# Patient Record
Sex: FEMALE | Race: BLACK OR AFRICAN AMERICAN | NOT HISPANIC OR LATINO | Employment: FULL TIME | RURAL
[De-identification: names, ages, dates, MRNs, and addresses within clinical notes are randomized per-mention and may not be internally consistent; named-entity substitution may affect disease eponyms.]

---

## 2019-09-24 ENCOUNTER — HISTORICAL (OUTPATIENT)
Dept: ADMINISTRATIVE | Facility: HOSPITAL | Age: 34
End: 2019-09-24

## 2019-09-26 LAB
LAB AP CLINICAL INFORMATION: NORMAL
LAB AP COMMENTS: NORMAL
LAB AP GENERAL CAT - HISTORICAL: NORMAL
LAB AP INTERPRETATION/RESULT - HISTORICAL: NEGATIVE
LAB AP SPECIMEN ADEQUACY - HISTORICAL: NORMAL
LAB AP SPECIMEN SUBMITTED - HISTORICAL: NORMAL

## 2019-10-22 ENCOUNTER — HISTORICAL (OUTPATIENT)
Dept: ADMINISTRATIVE | Facility: HOSPITAL | Age: 34
End: 2019-10-22

## 2019-10-23 LAB
LAB AP CLINICAL INFORMATION: NORMAL
LAB AP DIAGNOSIS - HISTORICAL: NORMAL
LAB AP GROSS PATHOLOGY - HISTORICAL: NORMAL
LAB AP SPECIMEN SUBMITTED - HISTORICAL: NORMAL

## 2020-03-05 ENCOUNTER — HISTORICAL (OUTPATIENT)
Dept: ADMINISTRATIVE | Facility: HOSPITAL | Age: 35
End: 2020-03-05

## 2020-10-15 ENCOUNTER — HISTORICAL (OUTPATIENT)
Dept: ADMINISTRATIVE | Facility: HOSPITAL | Age: 35
End: 2020-10-15

## 2020-10-15 LAB
25(OH)D3 SERPL-MCNC: 6.6 NG/ML
ALBUMIN SERPL BCP-MCNC: 3.4 G/DL (ref 3.5–5)
ALBUMIN/GLOB SERPL: 0.8 {RATIO}
ALP SERPL-CCNC: 42 U/L (ref 37–98)
ALT SERPL W P-5'-P-CCNC: 22 U/L (ref 13–56)
ANION GAP SERPL CALCULATED.3IONS-SCNC: 10.5 MMOL/L (ref 7–16)
AST SERPL W P-5'-P-CCNC: 13 U/L (ref 15–37)
BASOPHILS # BLD AUTO: 0.03 X10E3/UL (ref 0–0.2)
BASOPHILS NFR BLD AUTO: 0.4 % (ref 0–1)
BILIRUB SERPL-MCNC: 0.2 MG/DL (ref 0–1.2)
BUN SERPL-MCNC: 11 MG/DL (ref 7–18)
BUN/CREAT SERPL: 12
CALCIUM SERPL-MCNC: 8.8 MG/DL (ref 8.5–10.1)
CHLORIDE SERPL-SCNC: 107 MMOL/L (ref 98–107)
CHOLEST SERPL-MCNC: 162 MG/DL
CHOLEST/HDLC SERPL: 3.6 {RATIO}
CO2 SERPL-SCNC: 26 MMOL/L (ref 21–32)
CREAT SERPL-MCNC: 0.91 MG/DL (ref 0.5–1.02)
EOSINOPHIL # BLD AUTO: 0.17 X10E3/UL (ref 0–0.5)
EOSINOPHIL NFR BLD AUTO: 2.2 % (ref 1–4)
ERYTHROCYTE [DISTWIDTH] IN BLOOD BY AUTOMATED COUNT: 16.5 % (ref 11.5–14.5)
EST. AVERAGE GLUCOSE BLD GHB EST-MCNC: 114 MG/DL
GLOBULIN SER-MCNC: 4.4 G/DL (ref 2–4)
GLUCOSE SERPL-MCNC: 93 MG/DL (ref 74–106)
HBA1C MFR BLD HPLC: 6 %
HCT VFR BLD AUTO: 37.4 % (ref 38–47)
HDLC SERPL-MCNC: 45 MG/DL
HGB BLD-MCNC: 11.4 G/DL (ref 12–16)
IMM GRANULOCYTES # BLD AUTO: 0.01 X10E3/UL (ref 0–0.04)
IMM GRANULOCYTES NFR BLD: 0.1 % (ref 0–0.4)
LDLC SERPL CALC-MCNC: 93 MG/DL
LYMPHOCYTES # BLD AUTO: 2.94 X10E3/UL (ref 1–4.8)
LYMPHOCYTES NFR BLD AUTO: 37.2 % (ref 27–41)
MCH RBC QN AUTO: 24.9 PG (ref 27–31)
MCHC RBC AUTO-ENTMCNC: 30.5 G/DL (ref 32–36)
MCV RBC AUTO: 81.7 FL (ref 80–96)
MONOCYTES # BLD AUTO: 0.58 X10E3/UL (ref 0–0.8)
MONOCYTES NFR BLD AUTO: 7.3 % (ref 2–6)
MPC BLD CALC-MCNC: 11 FL (ref 9.4–12.4)
NEUTROPHILS # BLD AUTO: 4.17 X10E3/UL (ref 1.8–7.7)
NEUTROPHILS NFR BLD AUTO: 52.8 % (ref 53–65)
NRBC # BLD AUTO: 0 X10E3/UL (ref 0–0)
NRBC, AUTO (.00): 0 /100 (ref 0–0)
PLATELET # BLD AUTO: 410 X10E3/UL (ref 150–400)
POTASSIUM SERPL-SCNC: 3.5 MMOL/L (ref 3.5–5.1)
PROT SERPL-MCNC: 7.8 G/DL (ref 6.4–8.2)
RBC # BLD AUTO: 4.58 X10E6/UL (ref 4.2–5.4)
SODIUM SERPL-SCNC: 140 MMOL/L (ref 136–145)
TRIGL SERPL-MCNC: 119 MG/DL
TSH SERPL DL<=0.005 MIU/L-ACNC: 1.85 UIU/ML (ref 0.36–3.74)
WBC # BLD AUTO: 7.9 X10E3/UL (ref 4.5–11)

## 2021-01-15 ENCOUNTER — HISTORICAL (OUTPATIENT)
Dept: ADMINISTRATIVE | Facility: HOSPITAL | Age: 36
End: 2021-01-15

## 2021-01-19 LAB
LAB AP CLINICAL INFORMATION: NORMAL
LAB AP GENERAL CAT - HISTORICAL: NORMAL
LAB AP INTERPRETATION/RESULT - HISTORICAL: NEGATIVE
LAB AP SPECIMEN ADEQUACY - HISTORICAL: NORMAL
LAB AP SPECIMEN SUBMITTED - HISTORICAL: NORMAL

## 2021-10-21 ENCOUNTER — OFFICE VISIT (OUTPATIENT)
Dept: FAMILY MEDICINE | Facility: CLINIC | Age: 36
End: 2021-10-21
Payer: COMMERCIAL

## 2021-10-21 VITALS
TEMPERATURE: 98 F | WEIGHT: 279 LBS | BODY MASS INDEX: 44.84 KG/M2 | HEART RATE: 93 BPM | SYSTOLIC BLOOD PRESSURE: 136 MMHG | DIASTOLIC BLOOD PRESSURE: 77 MMHG | RESPIRATION RATE: 18 BRPM | HEIGHT: 66 IN

## 2021-10-21 DIAGNOSIS — J06.9 UPPER RESPIRATORY TRACT INFECTION, UNSPECIFIED TYPE: ICD-10-CM

## 2021-10-21 DIAGNOSIS — J02.9 SORE THROAT: ICD-10-CM

## 2021-10-21 DIAGNOSIS — Z11.59 ENCOUNTER FOR SCREENING FOR VIRAL DISEASE: Primary | ICD-10-CM

## 2021-10-21 LAB
CTP QC/QA: YES
CTP QC/QA: YES
FLUAV AG NPH QL: NEGATIVE
FLUBV AG NPH QL: NEGATIVE
S PYO RRNA THROAT QL PROBE: NEGATIVE
SARS-COV-2 AG RESP QL IA.RAPID: NEGATIVE

## 2021-10-21 PROCEDURE — 96372 PR INJECTION,THERAP/PROPH/DIAG2ST, IM OR SUBCUT: ICD-10-PCS | Mod: ,,, | Performed by: NURSE PRACTITIONER

## 2021-10-21 PROCEDURE — 87428 SARSCOV & INF VIR A&B AG IA: CPT | Mod: QW,,, | Performed by: NURSE PRACTITIONER

## 2021-10-21 PROCEDURE — 96372 THER/PROPH/DIAG INJ SC/IM: CPT | Mod: ,,, | Performed by: NURSE PRACTITIONER

## 2021-10-21 PROCEDURE — 87428 POCT SARS-COV2 (COVID) WITH FLU ANTIGEN: ICD-10-PCS | Mod: QW,,, | Performed by: NURSE PRACTITIONER

## 2021-10-21 PROCEDURE — 99213 OFFICE O/P EST LOW 20 MIN: CPT | Mod: 25,,, | Performed by: NURSE PRACTITIONER

## 2021-10-21 PROCEDURE — 87880 STREP A ASSAY W/OPTIC: CPT | Mod: QW,,, | Performed by: NURSE PRACTITIONER

## 2021-10-21 PROCEDURE — 99213 PR OFFICE/OUTPT VISIT, EST, LEVL III, 20-29 MIN: ICD-10-PCS | Mod: 25,,, | Performed by: NURSE PRACTITIONER

## 2021-10-21 PROCEDURE — 87880 POCT RAPID STREP A: ICD-10-PCS | Mod: QW,,, | Performed by: NURSE PRACTITIONER

## 2021-10-21 RX ORDER — LEVOCETIRIZINE DIHYDROCHLORIDE 5 MG/1
5 TABLET, FILM COATED ORAL NIGHTLY
Qty: 30 TABLET | Refills: 11 | Status: SHIPPED | OUTPATIENT
Start: 2021-10-21 | End: 2022-10-21

## 2021-10-21 RX ORDER — DOXYCYCLINE 100 MG/1
100 CAPSULE ORAL 2 TIMES DAILY
Qty: 20 CAPSULE | Refills: 0 | Status: SHIPPED | OUTPATIENT
Start: 2021-10-21 | End: 2021-10-31

## 2021-10-21 RX ORDER — METHYLPREDNISOLONE 4 MG/1
TABLET ORAL
Qty: 21 EACH | Refills: 0 | Status: SHIPPED | OUTPATIENT
Start: 2021-10-21 | End: 2021-11-11

## 2021-10-21 RX ORDER — CEFTRIAXONE 1 G/1
1 INJECTION, POWDER, FOR SOLUTION INTRAMUSCULAR; INTRAVENOUS ONCE
Status: COMPLETED | OUTPATIENT
Start: 2021-10-21 | End: 2021-10-21

## 2021-10-21 RX ADMIN — CEFTRIAXONE 1 G: 1 INJECTION, POWDER, FOR SOLUTION INTRAMUSCULAR; INTRAVENOUS at 12:10

## 2022-03-08 ENCOUNTER — LAB VISIT (OUTPATIENT)
Dept: PRIMARY CARE CLINIC | Facility: CLINIC | Age: 37
End: 2022-03-08

## 2022-03-08 DIAGNOSIS — Z02.83 ENCOUNTER FOR EMPLOYMENT-RELATED DRUG TESTING: ICD-10-CM

## 2022-03-08 PROCEDURE — 99000 PR SPECIMEN HANDLING,DR OFF->LAB: ICD-10-PCS | Mod: ,,, | Performed by: NURSE PRACTITIONER

## 2022-03-08 PROCEDURE — 99000 SPECIMEN HANDLING OFFICE-LAB: CPT | Mod: ,,, | Performed by: NURSE PRACTITIONER

## 2022-03-08 NOTE — PROGRESS NOTES
Subjective:       Patient ID: Wilver Torres is a 36 y.o. female.    Chief Complaint: No chief complaint on file.    HPI  Review of Systems      Objective:      Physical Exam    Assessment:       Problem List Items Addressed This Visit    None     Visit Diagnoses     Encounter for employment-related drug testing              Plan:       Drug testing only

## 2022-11-10 ENCOUNTER — OFFICE VISIT (OUTPATIENT)
Dept: FAMILY MEDICINE | Facility: CLINIC | Age: 37
End: 2022-11-10

## 2022-11-10 VITALS
WEIGHT: 282.81 LBS | SYSTOLIC BLOOD PRESSURE: 151 MMHG | OXYGEN SATURATION: 100 % | BODY MASS INDEX: 45.45 KG/M2 | DIASTOLIC BLOOD PRESSURE: 82 MMHG | RESPIRATION RATE: 18 BRPM | HEART RATE: 93 BPM | HEIGHT: 66 IN

## 2022-11-10 DIAGNOSIS — N91.2 AMENORRHEA: ICD-10-CM

## 2022-11-10 DIAGNOSIS — R42 DIZZINESS: ICD-10-CM

## 2022-11-10 DIAGNOSIS — R53.83 FATIGUE, UNSPECIFIED TYPE: Primary | ICD-10-CM

## 2022-11-10 DIAGNOSIS — R73.01 IMPAIRED FASTING BLOOD SUGAR: ICD-10-CM

## 2022-11-10 DIAGNOSIS — Z13.220 SCREENING FOR HYPERCHOLESTEROLEMIA: ICD-10-CM

## 2022-11-10 LAB
25(OH)D3 SERPL-MCNC: 20.8 NG/ML
ALBUMIN SERPL BCP-MCNC: 3.6 G/DL (ref 3.5–5)
ALBUMIN/GLOB SERPL: 0.9 {RATIO}
ALP SERPL-CCNC: 45 U/L (ref 37–98)
ALT SERPL W P-5'-P-CCNC: 22 U/L (ref 13–56)
ANION GAP SERPL CALCULATED.3IONS-SCNC: 10 MMOL/L (ref 7–16)
AST SERPL W P-5'-P-CCNC: 11 U/L (ref 15–37)
B-HCG UR QL: NEGATIVE
BASOPHILS # BLD AUTO: 0.03 K/UL (ref 0–0.2)
BASOPHILS NFR BLD AUTO: 0.4 % (ref 0–1)
BILIRUB SERPL-MCNC: 0.3 MG/DL (ref ?–1.2)
BILIRUB SERPL-MCNC: NORMAL MG/DL
BLOOD URINE, POC: NORMAL
BUN SERPL-MCNC: 13 MG/DL (ref 7–18)
BUN/CREAT SERPL: 16 (ref 6–20)
CALCIUM SERPL-MCNC: 9.1 MG/DL (ref 8.5–10.1)
CHLORIDE SERPL-SCNC: 106 MMOL/L (ref 98–107)
CHOLEST SERPL-MCNC: 176 MG/DL (ref 0–200)
CHOLEST/HDLC SERPL: 3.2 {RATIO}
CO2 SERPL-SCNC: 24 MMOL/L (ref 21–32)
COLOR, POC UA: YELLOW
CREAT SERPL-MCNC: 0.83 MG/DL (ref 0.55–1.02)
CREAT UR-MCNC: 165 MG/DL (ref 28–219)
CTP QC/QA: YES
DIFFERENTIAL METHOD BLD: ABNORMAL
EGFR (NO RACE VARIABLE) (RUSH/TITUS): 94 ML/MIN/1.73M²
EOSINOPHIL # BLD AUTO: 0.1 K/UL (ref 0–0.5)
EOSINOPHIL NFR BLD AUTO: 1.3 % (ref 1–4)
ERYTHROCYTE [DISTWIDTH] IN BLOOD BY AUTOMATED COUNT: 16.2 % (ref 11.5–14.5)
EST. AVERAGE GLUCOSE BLD GHB EST-MCNC: 100 MG/DL
ESTRADIOL SERPL-MCNC: 87.9 PG/ML
FOLATE SERPL-MCNC: 4.7 NG/ML (ref 3.1–17.5)
GLOBULIN SER-MCNC: 4.1 G/DL (ref 2–4)
GLUCOSE SERPL-MCNC: 77 MG/DL (ref 74–106)
GLUCOSE UR QL STRIP: NORMAL
HBA1C MFR BLD HPLC: 5.6 % (ref 4.5–6.6)
HCT VFR BLD AUTO: 38.7 % (ref 38–47)
HDLC SERPL-MCNC: 55 MG/DL (ref 40–60)
HGB BLD-MCNC: 12 G/DL (ref 12–16)
IMM GRANULOCYTES # BLD AUTO: 0.02 K/UL (ref 0–0.04)
IMM GRANULOCYTES NFR BLD: 0.3 % (ref 0–0.4)
KETONES UR QL STRIP: NORMAL
LDLC SERPL CALC-MCNC: 105 MG/DL
LDLC/HDLC SERPL: 1.9 {RATIO}
LEUKOCYTE ESTERASE URINE, POC: NORMAL
LYMPHOCYTES # BLD AUTO: 3.32 K/UL (ref 1–4.8)
LYMPHOCYTES NFR BLD AUTO: 43.1 % (ref 27–41)
MCH RBC QN AUTO: 25.8 PG (ref 27–31)
MCHC RBC AUTO-ENTMCNC: 31 G/DL (ref 32–36)
MCV RBC AUTO: 83.2 FL (ref 80–96)
MICROALBUMIN UR-MCNC: 0.9 MG/DL (ref 0–2.8)
MICROALBUMIN/CREAT RATIO PNL UR: 5.5 MG/G (ref 0–30)
MONOCYTES # BLD AUTO: 0.52 K/UL (ref 0–0.8)
MONOCYTES NFR BLD AUTO: 6.8 % (ref 2–6)
MPC BLD CALC-MCNC: 10 FL (ref 9.4–12.4)
NEUTROPHILS # BLD AUTO: 3.71 K/UL (ref 1.8–7.7)
NEUTROPHILS NFR BLD AUTO: 48.1 % (ref 53–65)
NITRITE, POC UA: NORMAL
NONHDLC SERPL-MCNC: 121 MG/DL
NRBC # BLD AUTO: 0 X10E3/UL
NRBC, AUTO (.00): 0 %
PH, POC UA: 5.5
PLATELET # BLD AUTO: 367 K/UL (ref 150–400)
POTASSIUM SERPL-SCNC: 4.1 MMOL/L (ref 3.5–5.1)
PROGEST SERPL-MCNC: 1.2 NG/ML
PROT SERPL-MCNC: 7.7 G/DL (ref 6.4–8.2)
PROTEIN, POC: NORMAL
RBC # BLD AUTO: 4.65 M/UL (ref 4.2–5.4)
SODIUM SERPL-SCNC: 136 MMOL/L (ref 136–145)
SPECIFIC GRAVITY, POC UA: 1.03
T4 FREE SERPL-MCNC: 0.95 NG/DL (ref 0.76–1.46)
TRIGL SERPL-MCNC: 81 MG/DL (ref 35–150)
TSH SERPL DL<=0.005 MIU/L-ACNC: 1.51 UIU/ML (ref 0.36–3.74)
UROBILINOGEN, POC UA: 0.2
VIT B12 SERPL-MCNC: 433 PG/ML (ref 193–986)
VLDLC SERPL-MCNC: 16 MG/DL
WBC # BLD AUTO: 7.7 K/UL (ref 4.5–11)

## 2022-11-10 PROCEDURE — 85025 COMPLETE CBC W/AUTO DIFF WBC: CPT | Mod: ,,, | Performed by: CLINICAL MEDICAL LABORATORY

## 2022-11-10 PROCEDURE — 82306 VITAMIN D 25 HYDROXY: CPT | Mod: ,,, | Performed by: CLINICAL MEDICAL LABORATORY

## 2022-11-10 PROCEDURE — 82746 ASSAY OF FOLIC ACID SERUM: CPT | Mod: ,,, | Performed by: CLINICAL MEDICAL LABORATORY

## 2022-11-10 PROCEDURE — 81025 URINE PREGNANCY TEST: CPT | Mod: QW,,, | Performed by: NURSE PRACTITIONER

## 2022-11-10 PROCEDURE — 84144 ASSAY OF PROGESTERONE: CPT | Mod: ,,, | Performed by: CLINICAL MEDICAL LABORATORY

## 2022-11-10 PROCEDURE — 84439 THYROID PANEL: ICD-10-PCS | Mod: ,,, | Performed by: CLINICAL MEDICAL LABORATORY

## 2022-11-10 PROCEDURE — 84443 THYROID PANEL: ICD-10-PCS | Mod: ,,, | Performed by: CLINICAL MEDICAL LABORATORY

## 2022-11-10 PROCEDURE — 84439 ASSAY OF FREE THYROXINE: CPT | Mod: ,,, | Performed by: CLINICAL MEDICAL LABORATORY

## 2022-11-10 PROCEDURE — 82306 VITAMIN D: ICD-10-PCS | Mod: ,,, | Performed by: CLINICAL MEDICAL LABORATORY

## 2022-11-10 PROCEDURE — 80061 LIPID PANEL: ICD-10-PCS | Mod: ,,, | Performed by: CLINICAL MEDICAL LABORATORY

## 2022-11-10 PROCEDURE — 82570 MICROALBUMIN / CREATININE RATIO URINE: ICD-10-PCS | Mod: ,,, | Performed by: CLINICAL MEDICAL LABORATORY

## 2022-11-10 PROCEDURE — 99213 PR OFFICE/OUTPT VISIT, EST, LEVL III, 20-29 MIN: ICD-10-PCS | Mod: ,,, | Performed by: NURSE PRACTITIONER

## 2022-11-10 PROCEDURE — 80053 COMPREHEN METABOLIC PANEL: CPT | Mod: ,,, | Performed by: CLINICAL MEDICAL LABORATORY

## 2022-11-10 PROCEDURE — 82746 VITAMIN B12/FOLATE, SERUM PANEL: ICD-10-PCS | Mod: ,,, | Performed by: CLINICAL MEDICAL LABORATORY

## 2022-11-10 PROCEDURE — 84144 PROGESTERONE: ICD-10-PCS | Mod: ,,, | Performed by: CLINICAL MEDICAL LABORATORY

## 2022-11-10 PROCEDURE — 84443 ASSAY THYROID STIM HORMONE: CPT | Mod: ,,, | Performed by: CLINICAL MEDICAL LABORATORY

## 2022-11-10 PROCEDURE — 81025 POCT URINE PREGNANCY: ICD-10-PCS | Mod: QW,,, | Performed by: NURSE PRACTITIONER

## 2022-11-10 PROCEDURE — 81003 URINALYSIS AUTO W/O SCOPE: CPT | Mod: QW,,, | Performed by: NURSE PRACTITIONER

## 2022-11-10 PROCEDURE — 80053 COMPREHENSIVE METABOLIC PANEL: ICD-10-PCS | Mod: ,,, | Performed by: CLINICAL MEDICAL LABORATORY

## 2022-11-10 PROCEDURE — 83036 HEMOGLOBIN A1C: ICD-10-PCS | Mod: ,,, | Performed by: CLINICAL MEDICAL LABORATORY

## 2022-11-10 PROCEDURE — 80061 LIPID PANEL: CPT | Mod: ,,, | Performed by: CLINICAL MEDICAL LABORATORY

## 2022-11-10 PROCEDURE — 82670 ASSAY OF TOTAL ESTRADIOL: CPT | Mod: ,,, | Performed by: CLINICAL MEDICAL LABORATORY

## 2022-11-10 PROCEDURE — 81003 POCT URINALYSIS W/O SCOPE: ICD-10-PCS | Mod: QW,,, | Performed by: NURSE PRACTITIONER

## 2022-11-10 PROCEDURE — 82043 UR ALBUMIN QUANTITATIVE: CPT | Mod: ,,, | Performed by: CLINICAL MEDICAL LABORATORY

## 2022-11-10 PROCEDURE — 82607 VITAMIN B12/FOLATE, SERUM PANEL: ICD-10-PCS | Mod: ,,, | Performed by: CLINICAL MEDICAL LABORATORY

## 2022-11-10 PROCEDURE — 82570 ASSAY OF URINE CREATININE: CPT | Mod: ,,, | Performed by: CLINICAL MEDICAL LABORATORY

## 2022-11-10 PROCEDURE — 85025 CBC WITH DIFFERENTIAL: ICD-10-PCS | Mod: ,,, | Performed by: CLINICAL MEDICAL LABORATORY

## 2022-11-10 PROCEDURE — 82607 VITAMIN B-12: CPT | Mod: ,,, | Performed by: CLINICAL MEDICAL LABORATORY

## 2022-11-10 PROCEDURE — 83036 HEMOGLOBIN GLYCOSYLATED A1C: CPT | Mod: ,,, | Performed by: CLINICAL MEDICAL LABORATORY

## 2022-11-10 PROCEDURE — 99213 OFFICE O/P EST LOW 20 MIN: CPT | Mod: ,,, | Performed by: NURSE PRACTITIONER

## 2022-11-10 PROCEDURE — 82670 ESTRADIOL: ICD-10-PCS | Mod: ,,, | Performed by: CLINICAL MEDICAL LABORATORY

## 2022-11-10 PROCEDURE — 82043 MICROALBUMIN / CREATININE RATIO URINE: ICD-10-PCS | Mod: ,,, | Performed by: CLINICAL MEDICAL LABORATORY

## 2022-11-10 NOTE — PROGRESS NOTES
"   Betty Hinton NP   [address] [place] [address]     PATIENT NAME: Wilver Torres  : 1985  DATE: 11/10/22  MRN: [id number]      Billing Provider: Betty Hinton NP  Level of Service: [place] OFFICE/OUTPT VISIT, EST, LEVL III, 20-29 MIN  Patient PCP Information       Provider PCP Type    Betty Hinton NP General            Reason for Visit / Chief Complaint: Dizziness and Fatigue       Update PCP  Update Chief Complaint         History of Present Illness / Problem Focused Workflow     Wilver Torres presents to the clinic with Dizziness and Fatigue     Patient is a 37 year old female presents to the clinic w/ complaints of generalized fatigue w/ occasional "dizziness." The patient reports she works night shifts and this dizziness only occurs during work. She reports the onset is quick but only last for 2-3 seconds. She does report she is hydrated. She denies any chest pain, shortness of breath, syncope. She states she has felt the impending faint. She does not take any daily medications and denies any pertinent past medical history.     Dizziness:   Chronicity:  New and recurrent  Onset:  More than 1 month ago  Duration:  Very brief  Dizziness characteristics:  Off-balance and lightheaded/impending faint   Associated symptoms: light-headedness.no fever, no headaches, no nausea, no vomiting, no diaphoresis, no weakness, no visual disturbances, no palpitations, no slurred speech, no numbness in extremities and no chest pain.  Fatigue  This is a new problem. The current episode started more than 1 month ago. Associated symptoms include fatigue. Pertinent negatives include no chest pain, diaphoresis, fever, headaches, nausea, vomiting or weakness.     Review of Systems     Review of Systems   Constitutional:  Positive for fatigue. Negative for diaphoresis and fever.   Cardiovascular:  Negative for chest pain and palpitations.   Gastrointestinal:  Negative for " "nausea and vomiting.   Neurological:  Positive for dizziness and light-headedness. Negative for weakness and headaches.   All other systems reviewed and are negative.     Medical / Social / Family History   History reviewed. No pertinent past medical history.    Past Surgical History:   Procedure Laterality Date    UTERINE FIBROID SURGERY         Social History  Ms.  reports that she has never smoked. She has never used smokeless tobacco. She reports current alcohol use. She reports that she does not use drugs.    Family History  MsConcha's family history is not on file.    Medications and Allergies     Medications  No outpatient medications have been marked as taking for the 11/10/22 encounter (Office Visit) with Betty Hinton NP.       Allergies  Review of patient's allergies indicates:  No Known Allergies    Physical Examination   BP (!) 151/82 (BP Location: Left arm, Patient Position: Sitting, BP Method: Medium (Automatic))   Pulse 93   Resp 18   Ht 5' 6" (1.676 m)   Wt 128.3 kg (282 lb 12.8 oz)   LMP 09/23/2022 (Approximate)   SpO2 100%   BMI 45.65 kg/m²    Physical Exam  Vitals and nursing note reviewed.   Constitutional:       Appearance: Normal appearance.   HENT:      Head: Normocephalic.      Right Ear: Tympanic membrane normal.      Left Ear: Tympanic membrane normal.      Nose: Nose normal.      Mouth/Throat:      Mouth: Mucous membranes are moist.      Pharynx: Oropharynx is clear.   Eyes:      Conjunctiva/sclera: Conjunctivae normal.      Pupils: Pupils are equal, round, and reactive to light.   Cardiovascular:      Rate and Rhythm: Normal rate and regular rhythm.      Pulses: Normal pulses.      Heart sounds: Normal heart sounds.   Pulmonary:      Effort: Pulmonary effort is normal.      Breath sounds: Normal breath sounds.   Abdominal:      General: Bowel sounds are normal.      Palpations: Abdomen is soft.   Musculoskeletal:         General: Normal range of motion.      Cervical back: " Normal range of motion and neck supple.   Skin:     General: Skin is warm.      Capillary Refill: Capillary refill takes less than 2 seconds.   Neurological:      General: No focal deficit present.      Mental Status: She is alert and oriented to person, place, and time.   Psychiatric:         Mood and Affect: Mood normal.         Thought Content: Thought content normal.        Assessment and Plan (including Health Maintenance)      Problem List  Smart Sets  Document Outside HM   :    Plan:         Health Maintenance Due   Topic Date Due    Hepatitis C Screening  Never done    Cervical Cancer Screening  Never done    Lipid Panel  Never done    COVID-19 Vaccine (1) Never done    HIV Screening  Never done    TETANUS VACCINE  Never done    Influenza Vaccine (1) Never done       Problem List Items Addressed This Visit    None  Visit Diagnoses       Fatigue, unspecified type    -  Primary    Relevant Orders    Thyroid Panel    CBC Auto Differential    Comprehensive Metabolic Panel    Progesterone    Estradiol    Dizziness        Relevant Orders    Vitamin D    Vitamin B12 & Folate    Progesterone    Estradiol    Impaired fasting blood sugar        Relevant Orders    Microalbumin/Creatinine Ratio, Urine    Hemoglobin A1C    Screening for hypercholesterolemia        Relevant Orders    Lipid Panel    Amenorrhea                The patient has no Health Maintenance topics of status Not Due    No future appointments.         Signature:  Betty Hinton NP      [address] [place] [address]    Date of encounter: 11/10/22

## 2023-12-15 ENCOUNTER — OFFICE VISIT (OUTPATIENT)
Dept: FAMILY MEDICINE | Facility: CLINIC | Age: 38
End: 2023-12-15
Payer: COMMERCIAL

## 2023-12-15 VITALS
SYSTOLIC BLOOD PRESSURE: 132 MMHG | RESPIRATION RATE: 18 BRPM | DIASTOLIC BLOOD PRESSURE: 86 MMHG | BODY MASS INDEX: 47.09 KG/M2 | HEART RATE: 84 BPM | HEIGHT: 66 IN | TEMPERATURE: 98 F | OXYGEN SATURATION: 99 % | WEIGHT: 293 LBS

## 2023-12-15 DIAGNOSIS — E66.01 MORBID OBESITY WITH BMI OF 45.0-49.9, ADULT: ICD-10-CM

## 2023-12-15 DIAGNOSIS — J02.9 SORE THROAT: ICD-10-CM

## 2023-12-15 DIAGNOSIS — R05.9 COUGH, UNSPECIFIED TYPE: Primary | ICD-10-CM

## 2023-12-15 DIAGNOSIS — R09.81 NASAL CONGESTION: ICD-10-CM

## 2023-12-15 DIAGNOSIS — J02.0 STREP THROAT: ICD-10-CM

## 2023-12-15 LAB
CTP QC/QA: YES
FLUAV AG NPH QL: NEGATIVE
FLUBV AG NPH QL: NEGATIVE
S PYO RRNA THROAT QL PROBE: POSITIVE
SARS-COV-2 AG RESP QL IA.RAPID: NEGATIVE

## 2023-12-15 PROCEDURE — 87804 INFLUENZA ASSAY W/OPTIC: CPT | Mod: 59,QW,,

## 2023-12-15 PROCEDURE — 87804 POCT INFLUENZA A/B: ICD-10-PCS | Mod: 59,QW,,

## 2023-12-15 PROCEDURE — 87426 SARS CORONAVIRUS 2 ANTIGEN POCT: ICD-10-PCS | Mod: QW,,,

## 2023-12-15 PROCEDURE — 87426 SARSCOV CORONAVIRUS AG IA: CPT | Mod: QW,,,

## 2023-12-15 PROCEDURE — 87880 STREP A ASSAY W/OPTIC: CPT | Mod: QW,,,

## 2023-12-15 PROCEDURE — 87880 POCT RAPID STREP A: ICD-10-PCS | Mod: QW,,,

## 2023-12-15 PROCEDURE — 99213 PR OFFICE/OUTPT VISIT, EST, LEVL III, 20-29 MIN: ICD-10-PCS | Mod: ,,,

## 2023-12-15 PROCEDURE — 99213 OFFICE O/P EST LOW 20 MIN: CPT | Mod: ,,,

## 2023-12-15 RX ORDER — METHYLPREDNISOLONE 4 MG/1
TABLET ORAL
Qty: 21 TABLET | Refills: 0 | Status: SHIPPED | OUTPATIENT
Start: 2023-12-15

## 2023-12-15 RX ORDER — AMOXICILLIN 500 MG/1
500 TABLET, FILM COATED ORAL EVERY 12 HOURS
Qty: 20 TABLET | Refills: 0 | Status: SHIPPED | OUTPATIENT
Start: 2023-12-15 | End: 2023-12-25

## 2023-12-15 RX ORDER — FLUTICASONE PROPIONATE 50 MCG
1 SPRAY, SUSPENSION (ML) NASAL DAILY
Qty: 11.1 ML | Refills: 0 | Status: SHIPPED | OUTPATIENT
Start: 2023-12-15

## 2023-12-15 NOTE — PROGRESS NOTES
Marichuy Colvin NP   [address] [place] [address]     PATIENT NAME: Wilver Torres  : 1985  DATE: 12/15/23  MRN: [id number]      Billing Provider: Marichuy Colvin NP  Level of Service:   Patient PCP Information       Provider PCP Type    Betty Hinton NP General            Reason for Visit / Chief Complaint: Cough, Nasal Congestion, and Sore Throat       Update PCP  Update Chief Complaint         History of Present Illness / Problem Focused Workflow     Wilver Torres presents to the clinic with Cough, Nasal Congestion, and Sore Throat     Patient here today with complaints of sinus pressure, cough, and sore throat. No fever, no chills, has body aches. Symptoms started 3 days ago. Positive for strep today. Medications sent to pharmacy.. Advised to change tooth brush and linen/pillow cases every 48 hours. Try warm salt water gargles and throat lozenges, frequent hand washing. Tylenol or Ibuprofen as needed for fever. Return to clinic if symptoms worsen and as needed.     Cough  Associated symptoms include a fever and a sore throat.   Sore Throat   Associated symptoms include congestion and coughing.     Review of Systems     Review of Systems   Constitutional:  Positive for fever.   HENT:  Positive for nasal congestion and sore throat.    Eyes: Negative.    Respiratory:  Positive for cough.    Cardiovascular: Negative.    Gastrointestinal: Negative.    Endocrine: Negative.    Genitourinary: Negative.    Musculoskeletal: Negative.    Integumentary:  Negative.   Allergic/Immunologic: Negative.    Neurological: Negative.    Hematological: Negative.    Psychiatric/Behavioral: Negative.        Medical / Social / Family History   History reviewed. No pertinent past medical history.    Past Surgical History:   Procedure Laterality Date    UTERINE FIBROID SURGERY         Social History  Ms.  reports that she has never smoked. She has never used smokeless tobacco. She reports  "current alcohol use. She reports that she does not use drugs.    Family History  Ms.'s family history is not on file.    Medications and Allergies     Medications  No outpatient medications have been marked as taking for the 12/15/23 encounter (Office Visit) with Marichuy Colvin NP.       Allergies  Review of patient's allergies indicates:  No Known Allergies    Physical Examination   BP (!) 137/94 (BP Location: Left arm, Patient Position: Sitting, BP Method: Large (Automatic))   Pulse 84   Temp 98.1 °F (36.7 °C) (Oral)   Resp 18   Ht 5' 6" (1.676 m)   Wt 133.8 kg (295 lb)   SpO2 99%   BMI 47.61 kg/m²    Physical Exam  Vitals and nursing note reviewed.   Constitutional:       Appearance: She is obese.   HENT:      Head: Normocephalic.      Right Ear: Tympanic membrane normal.      Left Ear: Tympanic membrane normal.      Nose: Congestion present. No rhinorrhea.      Mouth/Throat:      Mouth: Mucous membranes are moist.      Pharynx: Oropharyngeal exudate and posterior oropharyngeal erythema present.   Eyes:      Extraocular Movements: Extraocular movements intact.      Conjunctiva/sclera: Conjunctivae normal.      Pupils: Pupils are equal, round, and reactive to light.   Cardiovascular:      Rate and Rhythm: Normal rate and regular rhythm.      Pulses: Normal pulses.      Heart sounds: Normal heart sounds.   Pulmonary:      Effort: Pulmonary effort is normal.      Breath sounds: Normal breath sounds.   Abdominal:      General: Abdomen is flat. Bowel sounds are normal.      Palpations: Abdomen is soft.   Musculoskeletal:         General: Normal range of motion.      Cervical back: Normal range of motion and neck supple. No tenderness.   Lymphadenopathy:      Cervical: Cervical adenopathy present.   Skin:     General: Skin is warm and dry.      Capillary Refill: Capillary refill takes less than 2 seconds.   Neurological:      General: No focal deficit present.      Mental Status: She is alert and oriented " to person, place, and time. Mental status is at baseline.   Psychiatric:         Mood and Affect: Mood normal.         Behavior: Behavior normal.        Assessment and Plan (including Health Maintenance)      Problem List  Smart Sets  Document Outside HM   :    Plan:         Health Maintenance Due   Topic Date Due    Hepatitis C Screening  Never done    COVID-19 Vaccine (1) Never done    HIV Screening  Never done    TETANUS VACCINE  Never done    Influenza Vaccine (1) Never done    Hemoglobin A1c (Prediabetes)  11/10/2023    Cervical Cancer Screening  01/15/2024       Problem List Items Addressed This Visit    None  Visit Diagnoses       Cough, unspecified type    -  Primary    Relevant Orders    SARS Coronavirus 2 Antigen, POCT    POCT rapid strep A    POCT Influenza A/B            The patient has no Health Maintenance topics of status Not Due    No future appointments.         Signature:  Marichuy Colvin NP      [address] [place] [address]    Date of encounter: 12/15/23

## 2023-12-16 NOTE — PATIENT INSTRUCTIONS
change tooth brush and linen/pillow cases every 48 hours.   Try warm salt water gargles and throat lozenges,   frequent hand washing.   Tylenol or Ibuprofen as needed for fever.   Return to clinic if symptoms worsen and as needed.

## 2023-12-16 NOTE — ASSESSMENT & PLAN NOTE
Patient here today with complaints of sinus pressure, cough, and sore throat. No fever, no chills, has body aches. Symptoms started 3 days ago. Positive for strep today. Medications sent to pharmacy.. Advised to change tooth brush and linen/pillow cases every 48 hours. Try warm salt water gargles and throat lozenges, frequent hand washing. Tylenol or Ibuprofen as needed for fever. Return to clinic if symptoms worsen and as needed.

## 2024-09-23 ENCOUNTER — OFFICE VISIT (OUTPATIENT)
Dept: FAMILY MEDICINE | Facility: CLINIC | Age: 39
End: 2024-09-23
Payer: COMMERCIAL

## 2024-09-23 VITALS
OXYGEN SATURATION: 97 % | TEMPERATURE: 98 F | HEART RATE: 93 BPM | RESPIRATION RATE: 20 BRPM | SYSTOLIC BLOOD PRESSURE: 131 MMHG | BODY MASS INDEX: 46.77 KG/M2 | WEIGHT: 291 LBS | HEIGHT: 66 IN | DIASTOLIC BLOOD PRESSURE: 87 MMHG

## 2024-09-23 DIAGNOSIS — R05.9 COUGH, UNSPECIFIED TYPE: ICD-10-CM

## 2024-09-23 DIAGNOSIS — E66.01 MORBID OBESITY WITH BMI OF 45.0-49.9, ADULT: ICD-10-CM

## 2024-09-23 DIAGNOSIS — U07.1 COVID: ICD-10-CM

## 2024-09-23 DIAGNOSIS — J00 NASOPHARYNGITIS ACUTE: Primary | ICD-10-CM

## 2024-09-23 DIAGNOSIS — J02.9 SORE THROAT: ICD-10-CM

## 2024-09-23 LAB
CTP QC/QA: YES
MOLECULAR STREP A: NEGATIVE
POC MOLECULAR INFLUENZA A AGN: NEGATIVE
POC MOLECULAR INFLUENZA B AGN: NEGATIVE
SARS-COV-2 RDRP RESP QL NAA+PROBE: POSITIVE

## 2024-09-23 PROCEDURE — 1159F MED LIST DOCD IN RCRD: CPT | Mod: CPTII,,,

## 2024-09-23 PROCEDURE — 3079F DIAST BP 80-89 MM HG: CPT | Mod: CPTII,,,

## 2024-09-23 PROCEDURE — 3008F BODY MASS INDEX DOCD: CPT | Mod: CPTII,,,

## 2024-09-23 PROCEDURE — 3075F SYST BP GE 130 - 139MM HG: CPT | Mod: CPTII,,,

## 2024-09-23 PROCEDURE — 87502 INFLUENZA DNA AMP PROBE: CPT | Mod: QW,,,

## 2024-09-23 PROCEDURE — 87651 STREP A DNA AMP PROBE: CPT | Mod: QW,,,

## 2024-09-23 PROCEDURE — 99214 OFFICE O/P EST MOD 30 MIN: CPT | Mod: ,,,

## 2024-09-23 PROCEDURE — 87635 SARS-COV-2 COVID-19 AMP PRB: CPT | Mod: QW,,,

## 2024-09-23 PROCEDURE — 1160F RVW MEDS BY RX/DR IN RCRD: CPT | Mod: CPTII,,,

## 2024-09-23 RX ORDER — AZITHROMYCIN 250 MG/1
TABLET, FILM COATED ORAL
Qty: 6 TABLET | Refills: 0 | Status: SHIPPED | OUTPATIENT
Start: 2024-09-23

## 2024-09-23 RX ORDER — METHYLPREDNISOLONE 4 MG/1
TABLET ORAL
Qty: 21 TABLET | Refills: 0 | Status: SHIPPED | OUTPATIENT
Start: 2024-09-23

## 2024-09-23 NOTE — PROGRESS NOTES
Marichuy Colvin NP   [address] [place] [address]     PATIENT NAME: Wilver Torres  : 1985  DATE: 24  MRN: [id number]      Billing Provider: Marichuy Colvin NP  Level of Service:   Patient PCP Information       Provider PCP Type    Betty Hinton NP General            Reason for Visit / Chief Complaint: Cough and Nasal Congestion       Update PCP  Update Chief Complaint         History of Present Illness / Problem Focused Workflow     Wilver Torres presents to the clinic with Cough and Nasal Congestion     Patient here today with complaints of nasal congestion, cough, sore throat, loss of taste and smell, and fatigue. Symptoms started 3 days ago. Denies fever or body aches. Positive for COVID today. Medications sent to pharmacy. [facility] quarantine guidelines discussed with patient. Increase fluid intake.  Change tooth brush. Warm salt water gargles. Return to clinic if symptoms worsen and as needed.     Cough  Associated symptoms include postnasal drip, rhinorrhea and a sore throat. Pertinent negatives include no chest pain, ear pain, headaches, shortness of breath or wheezing.       Review of Systems     Review of Systems   Constitutional:  Positive for fatigue.   HENT:  Positive for nasal congestion, postnasal drip, rhinorrhea and sore throat. Negative for ear discharge and ear pain.    Eyes: Negative.    Respiratory:  Positive for cough (thicck yellow sputum). Negative for shortness of breath and wheezing.    Cardiovascular:  Negative for chest pain and palpitations.   Gastrointestinal:  Negative for abdominal pain and constipation.   Genitourinary: Negative.    Musculoskeletal: Negative.    Integumentary:  Negative.   Neurological: Negative.  Negative for dizziness and headaches.   Hematological: Negative.         Medical / Social / Family History   History reviewed. No pertinent past medical history.    Past Surgical History:   Procedure Laterality Date    UTERINE  "FIBROID SURGERY         Social History  MsConcha  reports that she has never smoked. She has never used smokeless tobacco. She reports current alcohol use. She reports that she does not use drugs.    Family History  Ms.'s family history is not on file.    Medications and Allergies     Medications  No outpatient medications have been marked as taking for the 9/23/24 encounter (Office Visit) with Marichuy Colvin NP.       Allergies  Review of patient's allergies indicates:  No Known Allergies    Physical Examination   /87 (BP Location: Left arm, Patient Position: Sitting, BP Method: Large (Automatic))   Pulse 93   Temp 98.3 °F (36.8 °C) (Oral)   Resp 20   Ht 5' 6" (1.676 m)   Wt 132 kg (291 lb)   SpO2 97%   BMI 46.97 kg/m²    Physical Exam  Vitals reviewed.   Constitutional:       Appearance: She is obese. She is ill-appearing.   HENT:      Right Ear: Tympanic membrane, ear canal and external ear normal.      Left Ear: Tympanic membrane, ear canal and external ear normal.      Nose: Congestion and rhinorrhea present. Rhinorrhea is purulent.      Mouth/Throat:      Pharynx: Pharyngeal swelling and posterior oropharyngeal erythema present. No oropharyngeal exudate.   Eyes:      Conjunctiva/sclera: Conjunctivae normal.      Pupils: Pupils are equal, round, and reactive to light.   Cardiovascular:      Rate and Rhythm: Normal rate and regular rhythm.      Pulses: Normal pulses.      Heart sounds: Normal heart sounds. No murmur heard.  Pulmonary:      Effort: Pulmonary effort is normal. No respiratory distress.      Breath sounds: Normal breath sounds. No wheezing.   Abdominal:      General: Bowel sounds are normal. There is no distension.      Palpations: Abdomen is soft.      Tenderness: There is no abdominal tenderness.   Musculoskeletal:         General: Normal range of motion.      Cervical back: Normal range of motion and neck supple.   Lymphadenopathy:      Cervical: Cervical adenopathy present. "   Skin:     General: Skin is warm and dry.      Capillary Refill: Capillary refill takes less than 2 seconds.   Neurological:      General: No focal deficit present.      Mental Status: She is alert and oriented to person, place, and time.        Assessment and Plan (including Health Maintenance)      Problem List  Smart Sets  Document Outside HM   :    Plan:   Medications sent to pharmacy  CDC quarantine guidelines discussed with patient  Increase fluid intake.  Change tooth brush  Warm salt water gargles. Return to clinic if symptoms worsen and as needed.           Health Maintenance Due   Topic Date Due    Hepatitis C Screening  Never done    HIV Screening  Never done    TETANUS VACCINE  Never done    Hemoglobin A1c (Prediabetes)  11/10/2023    Cervical Cancer Screening  01/15/2024    Influenza Vaccine (1) Never done    COVID-19 Vaccine (1 - 2023-24 season) Never done       Problem List Items Addressed This Visit          Pulmonary    Cough - Primary    Relevant Orders    POCT COVID-19 Rapid Screening (Completed)    POCT Influenza A/B Molecular (Completed)    POCT Strep A, Molecular (Completed)       The patient has no Health Maintenance topics of status Not Due    Future Appointments   Date Time Provider Department Center   9/23/2024 11:00 AM Marichuy Colvin NP [facility]            Signature:  Marichuy Colvin NP      [address] [place] [address]    Date of encounter: 9/23/24

## 2024-09-23 NOTE — PATIENT INSTRUCTIONS
Medications sent to pharmacy  CDC quarantine guidelines discussed with patient  Increase fluid intake.  Change tooth brush  Warm salt water gargles. Return to clinic if symptoms worsen and as needed.

## 2024-09-30 ENCOUNTER — OFFICE VISIT (OUTPATIENT)
Dept: FAMILY MEDICINE | Facility: CLINIC | Age: 39
End: 2024-09-30
Payer: COMMERCIAL

## 2024-09-30 ENCOUNTER — TELEPHONE (OUTPATIENT)
Dept: FAMILY MEDICINE | Facility: CLINIC | Age: 39
End: 2024-09-30
Payer: COMMERCIAL

## 2024-09-30 VITALS
SYSTOLIC BLOOD PRESSURE: 139 MMHG | OXYGEN SATURATION: 99 % | HEIGHT: 66 IN | WEIGHT: 288 LBS | BODY MASS INDEX: 46.28 KG/M2 | DIASTOLIC BLOOD PRESSURE: 87 MMHG | HEART RATE: 95 BPM | TEMPERATURE: 98 F | RESPIRATION RATE: 20 BRPM

## 2024-09-30 DIAGNOSIS — N89.8 VAGINAL DISCHARGE: ICD-10-CM

## 2024-09-30 DIAGNOSIS — E66.01 MORBID OBESITY WITH BMI OF 45.0-49.9, ADULT: ICD-10-CM

## 2024-09-30 DIAGNOSIS — Z12.4 SCREENING FOR CERVICAL CANCER: Primary | ICD-10-CM

## 2024-09-30 DIAGNOSIS — Z11.3 SCREENING EXAMINATION FOR STD (SEXUALLY TRANSMITTED DISEASE): ICD-10-CM

## 2024-09-30 LAB
BACTERIA VAG QL WET PREP: ABNORMAL /HPF
CLUE CELLS VAG QL WET PREP: ABNORMAL /HPF
RBC #/AREA VAG WET PREP: ABNORMAL /HPF
SQUAMOUS EPITHELIALS WET WOUNT, GENITAL: ABNORMAL /HPF
T VAGINALIS VAG QL WET PREP: ABNORMAL /HPF
WBC CLUMPS WET MOUNT, GENITAL: ABNORMAL /HPF
WBC VAG QL WET PREP: ABNORMAL /HPF
YEAST VAG QL WET PREP: ABNORMAL /HPF

## 2024-09-30 PROCEDURE — 1159F MED LIST DOCD IN RCRD: CPT | Mod: CPTII,,,

## 2024-09-30 PROCEDURE — 99395 PREV VISIT EST AGE 18-39: CPT | Mod: ,,,

## 2024-09-30 PROCEDURE — 87591 N.GONORRHOEAE DNA AMP PROB: CPT | Mod: ,,, | Performed by: CLINICAL MEDICAL LABORATORY

## 2024-09-30 PROCEDURE — 1160F RVW MEDS BY RX/DR IN RCRD: CPT | Mod: CPTII,,,

## 2024-09-30 PROCEDURE — 3075F SYST BP GE 130 - 139MM HG: CPT | Mod: CPTII,,,

## 2024-09-30 PROCEDURE — 3079F DIAST BP 80-89 MM HG: CPT | Mod: CPTII,,,

## 2024-09-30 PROCEDURE — 87491 CHLMYD TRACH DNA AMP PROBE: CPT | Mod: ,,, | Performed by: CLINICAL MEDICAL LABORATORY

## 2024-09-30 PROCEDURE — 3008F BODY MASS INDEX DOCD: CPT | Mod: CPTII,,,

## 2024-09-30 PROCEDURE — 87624 HPV HI-RISK TYP POOLED RSLT: CPT | Mod: ,,, | Performed by: CLINICAL MEDICAL LABORATORY

## 2024-09-30 PROCEDURE — 88142 CYTOPATH C/V THIN LAYER: CPT | Mod: TC,GCY

## 2024-09-30 PROCEDURE — 87210 SMEAR WET MOUNT SALINE/INK: CPT | Mod: ,,, | Performed by: CLINICAL MEDICAL LABORATORY

## 2024-09-30 RX ORDER — FLUCONAZOLE 150 MG/1
TABLET ORAL
Qty: 1 TABLET | Refills: 0 | Status: SHIPPED | OUTPATIENT
Start: 2024-09-30

## 2024-09-30 RX ORDER — METRONIDAZOLE 500 MG/1
500 TABLET ORAL EVERY 12 HOURS
Qty: 14 TABLET | Refills: 0 | Status: SHIPPED | OUTPATIENT
Start: 2024-09-30

## 2024-09-30 NOTE — PROGRESS NOTES
"   Marichuy Colvin NP   [address] [place] [address]     PATIENT NAME: Wilver oTrres  : 1985  DATE: 24  MRN: [id number]      Billing Provider: Marichuy Colvin NP  Level of Service:   Patient PCP Information       Provider PCP Type    Betty Hinton NP General            Reason for Visit / Chief Complaint: Gynecologic Exam       Subjective:       Wilver Torres is a 39 y.o. woman who comes in today for a  pap smear only.   Her most recent Pap smear was normal.     Contraception: none      Denies urinary symptoms  no family hx of female reproductive cancers  no family hx of breast cancer / and who?  Any previous history of STDs?  no         Review of Systems  Respiratory: negative for cough and dyspnea on exertion  Cardiovascular: negative for chest pain  Gastrointestinal: negative for abdominal pain  Genitourinary:negative for dysuria  Musculoskeletal:negative     Objective:      /87 (BP Location: Left arm, Patient Position: Sitting)   Pulse 95   Temp 98.1 °F (36.7 °C) (Oral)   Resp 20   Ht 5' 6" (1.676 m)   Wt 130.6 kg (288 lb)   SpO2 99%   BMI 46.48 kg/m²     ENT normal.  Neck supple. No adenopathy or thyromegaly.TEJAL.   Lungs are clear, good air entry, no wheezes, rhonchi or rales.   BREAST EXAM: breasts appear normal, no suspicious masses, no skin or nipple changes or axillary nodes  S1 and S2 normal, no murmurs, regular rate and rhythm.   Abdomen soft without tenderness, guarding, mass or organomegaly.   Pelvic Exam: cervix normal in appearance, external genitalia normal, and positive for vaginal discharge .     Extremities show no edema, normal peripheral pulses.   Neurological is normal, no focal findings.          Assessment:   Pap smear obtained.    Screening pap smear.         Problem List Items Addressed This Visit          Renal/    Vaginal discharge    Relevant Orders    Wet Prep, Genital    Screening for cervical cancer - Primary    " Relevant Orders    ThinPrep Pap Test       ID    Screening examination for STD (sexually transmitted disease)       Endocrine    Morbid obesity with BMI of 45.0-49.9, adult       Plan:      Follow up in 1 year, or as indicated by Pap results.  Labs today  Medications sent to pharmacy.  Start mammogram screening at 41

## 2024-09-30 NOTE — TELEPHONE ENCOUNTER
----- Message from Pamela sent at 9/30/2024 12:40 PM CDT -----  Regarding: pap results  Who Called: Wilver Torres    Caller is requesting assistance/information from provider's office.    Symptoms (please be specific): following up on pap results        Preferred Method of Contact: Phone Call  Patient's Preferred Phone Number on File: [phone]   Best Call Back Number, if different:  Additional Information:

## 2024-10-01 LAB
CHLAMYDIA BY PCR: NEGATIVE
GH SERPL-MCNC: NORMAL NG/ML
INSULIN SERPL-ACNC: NORMAL U[IU]/ML
LAB AP CLINICAL INFORMATION: NORMAL
LAB AP GYN INTERPRETATION: NEGATIVE
LAB AP PAP DISCLAIMER COMMENTS: NORMAL
N. GONORRHOEAE (GC) BY PCR: NEGATIVE
RENIN PLAS-CCNC: NORMAL NG/ML/H

## 2024-10-03 ENCOUNTER — TELEPHONE (OUTPATIENT)
Dept: FAMILY MEDICINE | Facility: CLINIC | Age: 39
End: 2024-10-03
Payer: COMMERCIAL

## 2024-10-03 ENCOUNTER — OFFICE VISIT (OUTPATIENT)
Dept: FAMILY MEDICINE | Facility: CLINIC | Age: 39
End: 2024-10-03
Payer: COMMERCIAL

## 2024-10-03 VITALS
DIASTOLIC BLOOD PRESSURE: 106 MMHG | HEIGHT: 66 IN | OXYGEN SATURATION: 100 % | HEART RATE: 106 BPM | SYSTOLIC BLOOD PRESSURE: 156 MMHG | BODY MASS INDEX: 46.61 KG/M2 | RESPIRATION RATE: 20 BRPM | TEMPERATURE: 99 F | WEIGHT: 290 LBS

## 2024-10-03 DIAGNOSIS — R87.619 ABNORMAL CERVICAL PAPANICOLAOU SMEAR, UNSPECIFIED ABNORMAL PAP FINDING: Primary | ICD-10-CM

## 2024-10-03 DIAGNOSIS — Z23 IMMUNIZATION DUE: ICD-10-CM

## 2024-10-03 DIAGNOSIS — Z71.2 ENCOUNTER TO DISCUSS TEST RESULTS: ICD-10-CM

## 2024-10-03 LAB
HPV 16: NEGATIVE
HPV 18: NEGATIVE
HPV OTHER: POSITIVE

## 2024-10-03 PROCEDURE — 90651 9VHPV VACCINE 2/3 DOSE IM: CPT | Mod: ,,,

## 2024-10-03 PROCEDURE — 90471 IMMUNIZATION ADMIN: CPT | Mod: ,,,

## 2024-10-03 PROCEDURE — 3080F DIAST BP >= 90 MM HG: CPT | Mod: CPTII,,,

## 2024-10-03 PROCEDURE — 3008F BODY MASS INDEX DOCD: CPT | Mod: CPTII,,,

## 2024-10-03 PROCEDURE — 3077F SYST BP >= 140 MM HG: CPT | Mod: CPTII,,,

## 2024-10-03 PROCEDURE — 1159F MED LIST DOCD IN RCRD: CPT | Mod: CPTII,,,

## 2024-10-03 PROCEDURE — 99213 OFFICE O/P EST LOW 20 MIN: CPT | Mod: 25,,,

## 2024-10-03 PROCEDURE — 1160F RVW MEDS BY RX/DR IN RCRD: CPT | Mod: CPTII,,,

## 2024-10-03 NOTE — PROGRESS NOTES
Marichuy Colvin NP   [address] [place] [address]     PATIENT NAME: Wilver Torres  : 1985  DATE: 10/3/24  MRN: [id number]      Billing Provider: Marichuy Colvin NP  Level of Service:   Patient PCP Information       Provider PCP Type    Marichuy Colvin NP General            Reason for Visit / Chief Complaint: Follow-up       Update PCP  Update Chief Complaint         History of Present Illness / Problem Focused Workflow     Wilver Torres presents to the clinic with Follow-up     Patient here today for test results. HPV on pap smear. Discussed possible complications related to HPV and need  HPV vaccine brochure and HPV patient education given to patient. She has been referred to GYN. Return to clinic in 2 months for HPV vaccine.     Follow-up      Review of Systems     Review of Systems   Constitutional: Negative.    HENT: Negative.     Eyes: Negative.    Respiratory: Negative.     Cardiovascular: Negative.    Gastrointestinal: Negative.    Endocrine: Negative.    Genitourinary: Negative.    Integumentary:  Negative.   Neurological: Negative.    Hematological: Negative.    Psychiatric/Behavioral:  The patient is nervous/anxious (anxious about test results).       Medical / Social / Family History   History reviewed. No pertinent past medical history.    Past Surgical History:   Procedure Laterality Date    UTERINE FIBROID SURGERY         Social History  Ms.  reports that she has never smoked. She has never used smokeless tobacco. She reports current alcohol use. She reports that she does not use drugs.    Family History  Ms.'s family history is not on file.    Medications and Allergies     Medications  No outpatient medications have been marked as taking for the 10/3/24 encounter (Office Visit) with Marichuy Colvin NP.       Allergies  Review of patient's allergies indicates:  No Known Allergies    Physical Examination   BP (!) 178/96 (BP Location: Left arm, Patient Position:  "Sitting)   Pulse 106   Temp 98.6 °F (37 °C) (Oral)   Resp 20   Ht 5' 6" (1.676 m)   Wt 131.5 kg (290 lb)   SpO2 100%   BMI 46.81 kg/m²    Physical Exam  Vitals reviewed.   Constitutional:       Appearance: Normal appearance. She is obese.   HENT:      Mouth/Throat:      Mouth: Mucous membranes are moist.   Eyes:      Pupils: Pupils are equal, round, and reactive to light.   Cardiovascular:      Rate and Rhythm: Normal rate and regular rhythm.      Pulses: Normal pulses.      Heart sounds: Normal heart sounds.   Pulmonary:      Effort: Pulmonary effort is normal.      Breath sounds: Normal breath sounds.   Abdominal:      General: Bowel sounds are normal.      Palpations: Abdomen is soft.      Tenderness: There is no abdominal tenderness.   Musculoskeletal:         General: Normal range of motion.      Cervical back: Normal range of motion and neck supple.   Skin:     General: Skin is warm and dry.      Capillary Refill: Capillary refill takes less than 2 seconds.   Neurological:      General: No focal deficit present.      Mental Status: She is alert and oriented to person, place, and time.   Psychiatric:         Mood and Affect: Affect is tearful.        Assessment and Plan (including Health Maintenance)      Problem List  Smart Sets  Document Outside HM   :    Plan:     HPV vaccine brochure and HPV patient education given to patient  She has been referred to GYN  Return to clinic in 2 months for HPV vaccine.       Health Maintenance Due   Topic Date Due    Hepatitis C Screening  Never done    HIV Screening  Never done    TETANUS VACCINE  Never done    Hemoglobin A1c (Prediabetes)  11/10/2023    Influenza Vaccine (1) Never done    COVID-19 Vaccine (1 - 2024-25 season) Never done       Problem List Items Addressed This Visit    None      Health Maintenance Topics with due status: Not Due       Topic Last Completion Date    Cervical Cancer Screening 09/30/2024    RSV Vaccine (Age 60+ and Pregnant patients) Not " Due       Future Appointments   Date Time Provider Department Center   10/3/2024 10:00 AM Marichuy Colvin NP [facility]   11/20/2024 10:30 AM Albino Bauman MD [facility]            Signature:  Marichuy Colvin NP      [address] [place] [address]    Date of encounter: 10/3/24

## 2024-10-03 NOTE — TELEPHONE ENCOUNTER
----- Message from Marichuy Colvin NP sent at 10/3/2024  7:46 AM CDT -----  Please contact the patient and let her know pap smear was abnormal. Referring to GYN.

## 2024-10-03 NOTE — TELEPHONE ENCOUNTER
Spoke with patient reguarding abnormal pap so patient is coming today for results. Also let her know a referral was being placed to her GYN.

## 2024-10-03 NOTE — PATIENT INSTRUCTIONS
HPV vaccine brochure and HPV patient education given to patient  She has been referred to GYN  Return to clinic in 2 months for HPV vaccine.

## 2024-10-03 NOTE — PROGRESS NOTES
Spoke with patient to come back in 2 months for 2nd HPV shot and appt scheduled with front office.

## 2024-11-22 ENCOUNTER — PATIENT MESSAGE (OUTPATIENT)
Dept: RESEARCH | Facility: HOSPITAL | Age: 39
End: 2024-11-22

## 2024-12-03 ENCOUNTER — OFFICE VISIT (OUTPATIENT)
Dept: FAMILY MEDICINE | Facility: CLINIC | Age: 39
End: 2024-12-03
Payer: COMMERCIAL

## 2024-12-03 VITALS
HEART RATE: 103 BPM | TEMPERATURE: 98 F | WEIGHT: 293 LBS | BODY MASS INDEX: 47.09 KG/M2 | RESPIRATION RATE: 20 BRPM | HEIGHT: 66 IN | DIASTOLIC BLOOD PRESSURE: 72 MMHG | SYSTOLIC BLOOD PRESSURE: 122 MMHG | OXYGEN SATURATION: 96 %

## 2024-12-03 DIAGNOSIS — Z13.1 SCREENING FOR DIABETES MELLITUS: Primary | ICD-10-CM

## 2024-12-03 DIAGNOSIS — Z11.4 ENCOUNTER FOR SCREENING FOR HIV: ICD-10-CM

## 2024-12-03 DIAGNOSIS — Z13.29 SCREENING FOR ENDOCRINE DISORDER: ICD-10-CM

## 2024-12-03 DIAGNOSIS — E66.01 MORBID OBESITY WITH BMI OF 45.0-49.9, ADULT: ICD-10-CM

## 2024-12-03 DIAGNOSIS — Z13.6 ENCOUNTER FOR SCREENING FOR CARDIOVASCULAR DISORDERS: ICD-10-CM

## 2024-12-03 LAB
25(OH)D3 SERPL-MCNC: 17.7 NG/ML (ref 30–80)
ALBUMIN SERPL BCP-MCNC: 3.5 G/DL (ref 3.5–5)
ALBUMIN/GLOB SERPL: 0.8 {RATIO}
ALP SERPL-CCNC: 42 U/L (ref 40–150)
ALT SERPL W P-5'-P-CCNC: 22 U/L
ANION GAP SERPL CALCULATED.3IONS-SCNC: 10 MMOL/L (ref 7–16)
AST SERPL W P-5'-P-CCNC: 43 U/L (ref 5–34)
BASOPHILS # BLD AUTO: 0.04 K/UL (ref 0–0.2)
BASOPHILS NFR BLD AUTO: 0.4 % (ref 0–1)
BILIRUB SERPL-MCNC: 0.4 MG/DL
BUN SERPL-MCNC: 8 MG/DL (ref 7–19)
BUN/CREAT SERPL: 10 (ref 6–20)
CALCIUM SERPL-MCNC: 8.6 MG/DL (ref 8.4–10.2)
CHLORIDE SERPL-SCNC: 106 MMOL/L (ref 98–107)
CHOLEST SERPL-MCNC: 166 MG/DL
CHOLEST/HDLC SERPL: 3.2 {RATIO}
CO2 SERPL-SCNC: 23 MMOL/L (ref 22–29)
CREAT SERPL-MCNC: 0.82 MG/DL (ref 0.55–1.02)
CREAT UR-MCNC: 241 MG/DL (ref 15–325)
DIFFERENTIAL METHOD BLD: ABNORMAL
EGFR (NO RACE VARIABLE) (RUSH/TITUS): 94 ML/MIN/1.73M2
EOSINOPHIL # BLD AUTO: 0.11 K/UL (ref 0–0.5)
EOSINOPHIL NFR BLD AUTO: 1.2 % (ref 1–4)
ERYTHROCYTE [DISTWIDTH] IN BLOOD BY AUTOMATED COUNT: 19.9 % (ref 11.5–14.5)
EST. AVERAGE GLUCOSE BLD GHB EST-MCNC: 126 MG/DL
FOLATE SERPL-MCNC: 4.7 NG/ML (ref 7–31.4)
GLOBULIN SER-MCNC: 4.2 G/DL (ref 2–4)
GLUCOSE SERPL-MCNC: 83 MG/DL (ref 74–100)
HBA1C MFR BLD HPLC: 6 %
HCT VFR BLD AUTO: 38.5 % (ref 38–47)
HDLC SERPL-MCNC: 52 MG/DL (ref 35–60)
HGB BLD-MCNC: 11 G/DL (ref 12–16)
HIV 1+O+2 AB SERPL QL: NORMAL
IMM GRANULOCYTES # BLD AUTO: 0.02 K/UL (ref 0–0.04)
IMM GRANULOCYTES NFR BLD: 0.2 % (ref 0–0.4)
LDLC SERPL CALC-MCNC: 101 MG/DL
LDLC/HDLC SERPL: 1.9 {RATIO}
LYMPHOCYTES # BLD AUTO: 2.93 K/UL (ref 1–4.8)
LYMPHOCYTES NFR BLD AUTO: 32 % (ref 27–41)
MCH RBC QN AUTO: 24.2 PG (ref 27–31)
MCHC RBC AUTO-ENTMCNC: 28.6 G/DL (ref 32–36)
MCV RBC AUTO: 84.8 FL (ref 80–96)
MICROALBUMIN UR-MCNC: 2.6 MG/DL
MICROALBUMIN/CREAT RATIO PNL UR: 10.8 MG/G (ref 0–30)
MONOCYTES # BLD AUTO: 0.59 K/UL (ref 0–0.8)
MONOCYTES NFR BLD AUTO: 6.4 % (ref 2–6)
MPC BLD CALC-MCNC: 10 FL (ref 9.4–12.4)
NEUTROPHILS # BLD AUTO: 5.46 K/UL (ref 1.8–7.7)
NEUTROPHILS NFR BLD AUTO: 59.8 % (ref 53–65)
NONHDLC SERPL-MCNC: 114 MG/DL
NRBC # BLD AUTO: 0 X10E3/UL
NRBC, AUTO (.00): 0 %
PLATELET # BLD AUTO: 376 K/UL (ref 150–400)
POTASSIUM SERPL-SCNC: 4.1 MMOL/L (ref 3.5–5.1)
PROT SERPL-MCNC: 7.7 G/DL (ref 6.4–8.3)
RBC # BLD AUTO: 4.54 M/UL (ref 4.2–5.4)
SODIUM SERPL-SCNC: 135 MMOL/L (ref 136–145)
T4 FREE SERPL-MCNC: 0.84 NG/DL (ref 0.7–1.48)
TRIGL SERPL-MCNC: 66 MG/DL (ref 37–140)
TSH SERPL DL<=0.005 MIU/L-ACNC: 1.88 UIU/ML (ref 0.35–4.94)
VIT B12 SERPL-MCNC: 672 PG/ML (ref 213–816)
VLDLC SERPL-MCNC: 13 MG/DL
WBC # BLD AUTO: 9.15 K/UL (ref 4.5–11)

## 2024-12-03 PROCEDURE — 3008F BODY MASS INDEX DOCD: CPT | Mod: CPTII,,,

## 2024-12-03 PROCEDURE — 80061 LIPID PANEL: CPT | Mod: ,,, | Performed by: CLINICAL MEDICAL LABORATORY

## 2024-12-03 PROCEDURE — 99214 OFFICE O/P EST MOD 30 MIN: CPT | Mod: ,,,

## 2024-12-03 PROCEDURE — 82570 ASSAY OF URINE CREATININE: CPT | Mod: ,,, | Performed by: CLINICAL MEDICAL LABORATORY

## 2024-12-03 PROCEDURE — 87389 HIV-1 AG W/HIV-1&-2 AB AG IA: CPT | Mod: ,,, | Performed by: CLINICAL MEDICAL LABORATORY

## 2024-12-03 PROCEDURE — 1160F RVW MEDS BY RX/DR IN RCRD: CPT | Mod: CPTII,,,

## 2024-12-03 PROCEDURE — 3078F DIAST BP <80 MM HG: CPT | Mod: CPTII,,,

## 2024-12-03 PROCEDURE — 3074F SYST BP LT 130 MM HG: CPT | Mod: CPTII,,,

## 2024-12-03 PROCEDURE — 83036 HEMOGLOBIN GLYCOSYLATED A1C: CPT | Mod: ,,, | Performed by: CLINICAL MEDICAL LABORATORY

## 2024-12-03 PROCEDURE — 82306 VITAMIN D 25 HYDROXY: CPT | Mod: ,,, | Performed by: CLINICAL MEDICAL LABORATORY

## 2024-12-03 PROCEDURE — 1159F MED LIST DOCD IN RCRD: CPT | Mod: CPTII,,,

## 2024-12-03 PROCEDURE — 84439 ASSAY OF FREE THYROXINE: CPT | Mod: ,,, | Performed by: CLINICAL MEDICAL LABORATORY

## 2024-12-03 PROCEDURE — 82607 VITAMIN B-12: CPT | Mod: ,,, | Performed by: CLINICAL MEDICAL LABORATORY

## 2024-12-03 PROCEDURE — 82043 UR ALBUMIN QUANTITATIVE: CPT | Mod: ,,, | Performed by: CLINICAL MEDICAL LABORATORY

## 2024-12-03 PROCEDURE — 82746 ASSAY OF FOLIC ACID SERUM: CPT | Mod: ,,, | Performed by: CLINICAL MEDICAL LABORATORY

## 2024-12-03 NOTE — PROGRESS NOTES
Marichuy Colvin NP   [address] [place] [address]     PATIENT NAME: Wilver Torres  : 1985  DATE: 12/3/24  MRN: [id number]      Billing Provider: Marichuy Colvin NP  Level of Service:   Patient PCP Information       Provider PCP Type    Marichuy Colvin NP General            Reason for Visit / Chief Complaint: Follow-up       Update PCP  Update Chief Complaint         History of Present Illness / Problem Focused Workflow     Wilver Torres presents to the clinic with Follow-up     Patient here today for follow up visit. Patient reports no issues today. She is interested in weight loss. Patient advised to follow up with weight loss clinic. Also advised on healthy diet, increase water intake, and exercise. Labs today. Return to clinic in 3 months and as needed.     Follow-up  Pertinent negatives include no abdominal pain, chest pain or coughing.       Review of Systems     Review of Systems   Constitutional: Negative.    HENT: Negative.     Eyes: Negative.    Respiratory: Negative.  Negative for cough and shortness of breath.    Cardiovascular: Negative.  Negative for chest pain and palpitations.   Gastrointestinal: Negative.  Negative for abdominal pain.   Genitourinary: Negative.    Musculoskeletal: Negative.    Integumentary:  Negative.   Neurological: Negative.    Psychiatric/Behavioral: Negative.          Medical / Social / Family History   History reviewed. No pertinent past medical history.    Past Surgical History:   Procedure Laterality Date    UTERINE FIBROID SURGERY         Social History  Ms.  reports that she has never smoked. She has never used smokeless tobacco. She reports current alcohol use. She reports that she does not use drugs.    Family History  Ms.'s family history is not on file.    Medications and Allergies     Medications  No outpatient medications have been marked as taking for the 12/3/24 encounter (Office Visit) with Marichuy Colvin NP.  "      Allergies  Review of patient's allergies indicates:  No Known Allergies    Physical Examination   /72 (BP Location: Left arm, Patient Position: Sitting)   Pulse 103   Temp 98.1 °F (36.7 °C) (Oral)   Resp 20   Ht 5' 6" (1.676 m)   Wt 135.6 kg (299 lb)   SpO2 96%   BMI 48.26 kg/m²    Physical Exam  Vitals reviewed.   Constitutional:       Appearance: Normal appearance. She is obese.   HENT:      Mouth/Throat:      Mouth: Mucous membranes are moist.      Pharynx: Oropharynx is clear.   Eyes:      Extraocular Movements: Extraocular movements intact.      Conjunctiva/sclera: Conjunctivae normal.      Pupils: Pupils are equal, round, and reactive to light.   Cardiovascular:      Rate and Rhythm: Normal rate and regular rhythm.      Pulses: Normal pulses.      Heart sounds: Normal heart sounds. No murmur heard.  Pulmonary:      Effort: Pulmonary effort is normal. No respiratory distress.      Breath sounds: Normal breath sounds. No wheezing.   Abdominal:      General: Bowel sounds are normal.      Palpations: Abdomen is soft.      Tenderness: There is no abdominal tenderness.   Musculoskeletal:         General: Normal range of motion.      Cervical back: Normal range of motion and neck supple.   Lymphadenopathy:      Cervical: No cervical adenopathy.   Skin:     General: Skin is warm and dry.      Capillary Refill: Capillary refill takes less than 2 seconds.   Neurological:      General: No focal deficit present.      Mental Status: She is alert and oriented to person, place, and time.   Psychiatric:         Mood and Affect: Mood normal.          Assessment and Plan (including Health Maintenance)      Problem List  Smart Sets  Document Outside HM   :    Plan:   Labs today  Return to clinic in 3 months and as needed.       Health Maintenance Due   Topic Date Due    Hepatitis C Screening  Never done    HIV Screening  Never done    TETANUS VACCINE  Never done    Influenza Vaccine (1) Never done    COVID-19 " Vaccine (1 - 2024-25 season) Never done       Problem List Items Addressed This Visit       Morbid obesity with BMI of 45.0-49.9, adult - Primary    Relevant Orders    Hemoglobin A1C    Microalbumin/Creatinine Ratio, Urine    Lipid Panel    Comprehensive Metabolic Panel    CBC Auto Differential    HIV 1/2 Ag/Ab (4th Gen)    Thyroid Panel    Vitamin B12 & Folate    Vitamin D       Health Maintenance Topics with due status: Not Due       Topic Last Completion Date    Hemoglobin A1c (Diabetic Prevention Screening) 11/10/2022    Cervical Cancer Screening 09/30/2024    RSV Vaccine (Age 60+ and Pregnant patients) Not Due       Future Appointments   Date Time Provider Department Center   4/3/2025  9:00 AM Adin Dooley DO [facility]            Signature:  Marichuy Colvin NP      [address] [place] [address]    Date of encounter: 12/3/24

## 2024-12-10 ENCOUNTER — PATIENT MESSAGE (OUTPATIENT)
Dept: FAMILY MEDICINE | Facility: CLINIC | Age: 39
End: 2024-12-10
Payer: COMMERCIAL

## 2024-12-10 DIAGNOSIS — E55.9 VITAMIN D DEFICIENCY: ICD-10-CM

## 2024-12-10 DIAGNOSIS — E53.8 FOLATE DEFICIENCY: Primary | ICD-10-CM

## 2024-12-10 RX ORDER — ERGOCALCIFEROL 1.25 MG/1
50000 CAPSULE ORAL
Qty: 12 CAPSULE | Refills: 1 | Status: SHIPPED | OUTPATIENT
Start: 2024-12-10

## 2024-12-10 RX ORDER — FOLIC ACID 1 MG/1
1 TABLET ORAL DAILY
Qty: 90 TABLET | Refills: 1 | Status: SHIPPED | OUTPATIENT
Start: 2024-12-10

## 2025-03-11 ENCOUNTER — OFFICE VISIT (OUTPATIENT)
Dept: FAMILY MEDICINE | Facility: CLINIC | Age: 40
End: 2025-03-11
Payer: COMMERCIAL

## 2025-03-11 VITALS
HEART RATE: 103 BPM | RESPIRATION RATE: 20 BRPM | OXYGEN SATURATION: 100 % | DIASTOLIC BLOOD PRESSURE: 83 MMHG | WEIGHT: 293 LBS | HEIGHT: 66 IN | SYSTOLIC BLOOD PRESSURE: 126 MMHG | TEMPERATURE: 98 F | BODY MASS INDEX: 47.09 KG/M2

## 2025-03-11 DIAGNOSIS — R73.01 IMPAIRED FASTING GLUCOSE: Primary | ICD-10-CM

## 2025-03-11 DIAGNOSIS — Z11.59 NEED FOR HEPATITIS C SCREENING TEST: ICD-10-CM

## 2025-03-11 DIAGNOSIS — E66.01 MORBID OBESITY WITH BMI OF 45.0-49.9, ADULT: ICD-10-CM

## 2025-03-11 DIAGNOSIS — E53.8 FOLATE DEFICIENCY: ICD-10-CM

## 2025-03-11 DIAGNOSIS — E55.9 VITAMIN D DEFICIENCY: ICD-10-CM

## 2025-03-11 LAB
ALBUMIN SERPL BCP-MCNC: 3.2 G/DL (ref 3.5–5)
ALBUMIN/GLOB SERPL: 0.9 {RATIO}
ALP SERPL-CCNC: 43 U/L (ref 40–150)
ALT SERPL W P-5'-P-CCNC: 16 U/L
ANION GAP SERPL CALCULATED.3IONS-SCNC: 9 MMOL/L (ref 7–16)
AST SERPL W P-5'-P-CCNC: 18 U/L (ref 11–45)
BILIRUB SERPL-MCNC: 0.2 MG/DL
BUN SERPL-MCNC: 7 MG/DL (ref 7–19)
BUN/CREAT SERPL: 9 (ref 6–20)
CALCIUM SERPL-MCNC: 8.5 MG/DL (ref 8.4–10.2)
CHLORIDE SERPL-SCNC: 111 MMOL/L (ref 98–107)
CO2 SERPL-SCNC: 23 MMOL/L (ref 22–29)
CREAT SERPL-MCNC: 0.79 MG/DL (ref 0.55–1.02)
EGFR (NO RACE VARIABLE) (RUSH/TITUS): 98 ML/MIN/1.73M2
EST. AVERAGE GLUCOSE BLD GHB EST-MCNC: 126 MG/DL
GLOBULIN SER-MCNC: 3.5 G/DL (ref 2–4)
GLUCOSE SERPL-MCNC: 72 MG/DL (ref 74–100)
HBA1C MFR BLD HPLC: 6 %
HCV AB SER QL: NORMAL
POTASSIUM SERPL-SCNC: 3.7 MMOL/L (ref 3.5–5.1)
PROT SERPL-MCNC: 6.7 G/DL (ref 6.4–8.3)
SODIUM SERPL-SCNC: 139 MMOL/L (ref 136–145)

## 2025-03-11 PROCEDURE — 3079F DIAST BP 80-89 MM HG: CPT | Mod: CPTII,,,

## 2025-03-11 PROCEDURE — 86803 HEPATITIS C AB TEST: CPT | Mod: ,,, | Performed by: CLINICAL MEDICAL LABORATORY

## 2025-03-11 PROCEDURE — 83036 HEMOGLOBIN GLYCOSYLATED A1C: CPT | Mod: ,,, | Performed by: CLINICAL MEDICAL LABORATORY

## 2025-03-11 PROCEDURE — 1160F RVW MEDS BY RX/DR IN RCRD: CPT | Mod: CPTII,,,

## 2025-03-11 PROCEDURE — 99214 OFFICE O/P EST MOD 30 MIN: CPT | Mod: ,,,

## 2025-03-11 PROCEDURE — 3074F SYST BP LT 130 MM HG: CPT | Mod: CPTII,,,

## 2025-03-11 PROCEDURE — 1159F MED LIST DOCD IN RCRD: CPT | Mod: CPTII,,,

## 2025-03-11 PROCEDURE — 3008F BODY MASS INDEX DOCD: CPT | Mod: CPTII,,,

## 2025-03-11 PROCEDURE — 80053 COMPREHEN METABOLIC PANEL: CPT | Mod: ,,, | Performed by: CLINICAL MEDICAL LABORATORY

## 2025-03-11 NOTE — PATIENT INSTRUCTIONS
Continue vitamin d and folic acid for deficiency  Labs today.  declines Tdap and flu vaccine.   Advised on diet and exercise to lower BMI  Return to clinic in 6 months and as needed.

## 2025-03-11 NOTE — PROGRESS NOTES
Marichuy Colvin NP   [address] [place] [address]     PATIENT NAME: Wilver Torres  : 1985  DATE: 3/11/25  MRN: [id number]      Billing Provider: Marichuy Colvin NP  Level of Service:   Patient PCP Information       Provider PCP Type    Marichuy Colvin NP General            Reason for Visit / Chief Complaint: Follow-up () and Health Maintenance (Hepatitis C Screening Never done/TETANUS VACCINE Never done/Influenza Vaccine(1) Never done/COVID-19 Vaccine( season) Never done/)       Update PCP  Update Chief Complaint         History of Present Illness / Problem Focused Workflow     Wilver Torres presents to the clinic with Follow-up () and Health Maintenance (Hepatitis C Screening Never done/TETANUS VACCINE Never done/Influenza Vaccine(1) Never done/COVID-19 Vaccine( season) Never done/)     Here today for follow up visit . Reports no issues today. Continue vitamin d and folic acid for deficiency. Labs today. She declines Tdap and flu vaccine. Advised on diet and exercise to lower BMI. Return to clinic in 6 months and as needed.     Follow-up  Pertinent negatives include no abdominal pain, chest pain, coughing, headaches, nausea or vomiting.       Review of Systems     Review of Systems   Constitutional: Negative.    HENT: Negative.     Eyes: Negative.    Respiratory: Negative.  Negative for cough, shortness of breath and wheezing.    Cardiovascular: Negative.  Negative for chest pain and palpitations.   Gastrointestinal: Negative.  Negative for abdominal pain, nausea and vomiting.   Endocrine: Negative.    Genitourinary: Negative.    Integumentary:  Negative.   Allergic/Immunologic: Negative.    Neurological: Negative.  Negative for dizziness and headaches.   Psychiatric/Behavioral: Negative.          Medical / Social / Family History   History reviewed. No pertinent past medical history.    Past Surgical History:   Procedure Laterality Date    UTERINE  "FIBROID SURGERY         Social History  Ms.  reports that she has never smoked. She has never used smokeless tobacco. She reports current alcohol use. She reports that she does not use drugs.    Family History  Ms.'s family history is not on file.    Medications and Allergies     Medications  No outpatient medications have been marked as taking for the 3/11/25 encounter (Office Visit) with Marichuy Colvin NP.       Allergies  Review of patient's allergies indicates:  No Known Allergies    Physical Examination   /83 (BP Location: Left arm, Patient Position: Sitting)   Pulse 103   Temp 98.3 °F (36.8 °C) (Oral)   Resp 20   Ht 5' 6" (1.676 m)   Wt (!) 137.9 kg (304 lb)   SpO2 100%   BMI 49.07 kg/m²    Physical Exam  Vitals reviewed.   Constitutional:       Appearance: Normal appearance. She is obese.   HENT:      Right Ear: Tympanic membrane, ear canal and external ear normal.      Left Ear: Tympanic membrane, ear canal and external ear normal.      Nose: Nose normal. No congestion or rhinorrhea.      Mouth/Throat:      Mouth: Mucous membranes are moist.      Pharynx: Oropharynx is clear. No posterior oropharyngeal erythema.   Eyes:      Extraocular Movements: Extraocular movements intact.      Conjunctiva/sclera: Conjunctivae normal.      Pupils: Pupils are equal, round, and reactive to light.   Cardiovascular:      Rate and Rhythm: Normal rate and regular rhythm.      Pulses: Normal pulses.      Heart sounds: Normal heart sounds.   Pulmonary:      Effort: Pulmonary effort is normal.      Breath sounds: Normal breath sounds.   Abdominal:      General: Bowel sounds are normal.      Palpations: Abdomen is soft.      Tenderness: There is no abdominal tenderness. There is no guarding.   Musculoskeletal:         General: Normal range of motion.      Cervical back: Normal range of motion and neck supple.   Skin:     General: Skin is warm and dry.      Capillary Refill: Capillary refill takes less than 2 " seconds.   Neurological:      General: No focal deficit present.      Mental Status: She is alert and oriented to person, place, and time.   Psychiatric:         Mood and Affect: Mood normal.         Behavior: Behavior normal.         Thought Content: Thought content normal.         Judgment: Judgment normal.          Assessment and Plan (including Health Maintenance)      Problem List  Smart Sets  Document Outside HM   :    Plan:   Continue vitamin d and folic acid for deficiency  Labs today.  declines Tdap and flu vaccine.   Advised on diet and exercise to lower BMI  Return to clinic in 6 months and as needed.         Health Maintenance Due   Topic Date Due    Hepatitis C Screening  Never done    TETANUS VACCINE  Never done    Influenza Vaccine (1) Never done    COVID-19 Vaccine (1 - 2024-25 season) Never done       Problem List Items Addressed This Visit       Morbid obesity with BMI of 45.0-49.9, adult - Primary    Relevant Orders    Comprehensive Metabolic Panel    Hepatitis C Antibody    Hemoglobin A1C       Health Maintenance Topics with due status: Not Due       Topic Last Completion Date    Cervical Cancer Screening 09/30/2024    Hemoglobin A1c (Diabetic Prevention Screening) 12/03/2024    RSV Vaccine (Age 60+ and Pregnant patients) Not Due       Future Appointments   Date Time Provider Department Center   4/3/2025  9:00 AM Adin Dooley DO OB OBGYN [facility]   9/16/2025  9:00 AM Marichuy Colvin NP [facility]            Signature:  Marichuy Colvin NP      [address] [place] [address]    Date of encounter: 3/11/25

## 2025-03-17 ENCOUNTER — RESULTS FOLLOW-UP (OUTPATIENT)
Dept: FAMILY MEDICINE | Facility: CLINIC | Age: 40
End: 2025-03-17

## 2025-04-03 ENCOUNTER — OFFICE VISIT (OUTPATIENT)
Dept: OBSTETRICS AND GYNECOLOGY | Facility: CLINIC | Age: 40
End: 2025-04-03
Payer: COMMERCIAL

## 2025-04-03 VITALS
SYSTOLIC BLOOD PRESSURE: 153 MMHG | WEIGHT: 293 LBS | BODY MASS INDEX: 48.42 KG/M2 | HEART RATE: 98 BPM | DIASTOLIC BLOOD PRESSURE: 82 MMHG

## 2025-04-03 DIAGNOSIS — R87.619 ABNORMAL CERVICAL PAPANICOLAOU SMEAR, UNSPECIFIED ABNORMAL PAP FINDING: Primary | ICD-10-CM

## 2025-04-03 DIAGNOSIS — Z12.4 SCREENING FOR CERVICAL CANCER: ICD-10-CM

## 2025-04-03 PROCEDURE — 1159F MED LIST DOCD IN RCRD: CPT | Mod: ,,, | Performed by: STUDENT IN AN ORGANIZED HEALTH CARE EDUCATION/TRAINING PROGRAM

## 2025-04-03 PROCEDURE — 3077F SYST BP >= 140 MM HG: CPT | Mod: ,,, | Performed by: STUDENT IN AN ORGANIZED HEALTH CARE EDUCATION/TRAINING PROGRAM

## 2025-04-03 PROCEDURE — 99213 OFFICE O/P EST LOW 20 MIN: CPT | Mod: PBBFAC | Performed by: STUDENT IN AN ORGANIZED HEALTH CARE EDUCATION/TRAINING PROGRAM

## 2025-04-03 PROCEDURE — 99203 OFFICE O/P NEW LOW 30 MIN: CPT | Mod: S$PBB,,, | Performed by: STUDENT IN AN ORGANIZED HEALTH CARE EDUCATION/TRAINING PROGRAM

## 2025-04-03 PROCEDURE — 99999 PR PBB SHADOW E&M-EST. PATIENT-LVL III: CPT | Mod: PBBFAC,,, | Performed by: STUDENT IN AN ORGANIZED HEALTH CARE EDUCATION/TRAINING PROGRAM

## 2025-04-03 PROCEDURE — 3008F BODY MASS INDEX DOCD: CPT | Mod: ,,, | Performed by: STUDENT IN AN ORGANIZED HEALTH CARE EDUCATION/TRAINING PROGRAM

## 2025-04-03 PROCEDURE — 3079F DIAST BP 80-89 MM HG: CPT | Mod: ,,, | Performed by: STUDENT IN AN ORGANIZED HEALTH CARE EDUCATION/TRAINING PROGRAM

## 2025-04-03 PROCEDURE — 3044F HG A1C LEVEL LT 7.0%: CPT | Mod: ,,, | Performed by: STUDENT IN AN ORGANIZED HEALTH CARE EDUCATION/TRAINING PROGRAM

## 2025-04-03 NOTE — PROGRESS NOTES
Gynecology History and Physical    Assessment/Plan:   Problem List Items Addressed This Visit          Renal/    Screening for cervical cancer - Primary     Other Visit Diagnoses         Abnormal cervical Papanicolaou smear, unspecified abnormal pap finding                  CC:   Chief Complaint   Patient presents with    Follow-up     Pt is in for a Abnormal cervical Papanicolaou smear, unspecified abnormal pap finding.     HPI: Wilver Torres is a 40 y.o. female who presents with complaints of abnormal Pap.  Last Pap 9/2024 with NILM, other HR HPV positive. Denies previous history of abnormal paps.    Review of Systems: The following ROS was otherwise negative, except as noted in the HPI:  constitutional, HEENT, respiratory, cardiovascular, gastrointestinal, genitourinary, skin, musculoskeletal, neurological, psych    Gynecologic History:   denies history of abnormal pap smears  denies history of of STIs  Menstrual history:       Obstetrical History:  OB History    No obstetric history on file.         Past Medical History:   Past Medical History:   Diagnosis Date    Abnormal uterine bleeding     Amenorrhea     Anemia        Medications:  Medication List with Changes/Refills   Current Medications    ERGOCALCIFEROL (ERGOCALCIFEROL) 50,000 UNIT CAP    Take 1 capsule (50,000 Units total) by mouth every 7 days.    FOLIC ACID (FOLVITE) 1 MG TABLET    Take 1 tablet (1 mg total) by mouth once daily.    LEVOCETIRIZINE (XYZAL) 5 MG TABLET    Take 1 tablet (5 mg total) by mouth every evening.       Allergies:  Patient has no known allergies.    Surgical History:  Past Surgical History:   Procedure Laterality Date    UTERINE FIBROID SURGERY         Family History:  No family history on file.    Social History:  Social History     Substance and Sexual Activity   Alcohol Use Yes     Social History     Substance and Sexual Activity   Drug Use Never     Tobacco Use History[1]    Physical Exam:  BP (!) 153/82 (BP Location: Left  arm, Patient Position: Sitting)   Pulse 98   Wt 136.1 kg (300 lb)   LMP 03/04/2025   BMI 48.42 kg/m²     General: Alert, well appearing, no acute distress  Head: Normocephalic, atraumatic   Lungs: Unlabored respirations  Abdomen: Soft, nontender, nondistended   Pelvic: deferred  Extremities: No redness or tenderness  Skin: Well perfused, normal coloration and turgor, no lesions or rashes visualized  Neuro: Alert, oriented, normal speech, no focal deficits, moves extremities appropriately  Osteopathic: No TART changes    Labs:  No visits with results within 1 Day(s) from this visit.   Latest known visit with results is:   Office Visit on 03/11/2025   Component Date Value    Sodium 03/11/2025 139     Potassium 03/11/2025 3.7     Chloride 03/11/2025 111 (H)     CO2 03/11/2025 23     Anion Gap 03/11/2025 9     Glucose 03/11/2025 72 (L)     BUN 03/11/2025 7     Creatinine 03/11/2025 0.79     BUN/Creatinine Ratio 03/11/2025 9     Calcium 03/11/2025 8.5     Total Protein 03/11/2025 6.7     Albumin 03/11/2025 3.2 (L)     Globulin 03/11/2025 3.5     A/G Ratio 03/11/2025 0.9     Bilirubin, Total 03/11/2025 0.2     Alk Phos 03/11/2025 43     ALT 03/11/2025 16     AST 03/11/2025 18     eGFR 03/11/2025 98     Hepatitis C Ab 03/11/2025 Non-Reactive     Hemoglobin A1C 03/11/2025 6.0     Estimated Average Glucose 03/11/2025 126       Recommend repeat Pap in 6 mo (12mo from abnormal Pap)       [1]   Social History  Tobacco Use   Smoking Status Every Day    Types: Cigars   Smokeless Tobacco Never

## 2025-06-25 ENCOUNTER — OFFICE VISIT (OUTPATIENT)
Dept: FAMILY MEDICINE | Facility: CLINIC | Age: 40
End: 2025-06-25
Payer: COMMERCIAL

## 2025-06-25 VITALS
WEIGHT: 293 LBS | HEART RATE: 98 BPM | SYSTOLIC BLOOD PRESSURE: 123 MMHG | OXYGEN SATURATION: 100 % | DIASTOLIC BLOOD PRESSURE: 82 MMHG | TEMPERATURE: 98 F | BODY MASS INDEX: 47.09 KG/M2 | HEIGHT: 66 IN | RESPIRATION RATE: 20 BRPM

## 2025-06-25 DIAGNOSIS — M25.561 PAIN IN BOTH KNEES, UNSPECIFIED CHRONICITY: ICD-10-CM

## 2025-06-25 DIAGNOSIS — N91.2 AMENORRHEA: ICD-10-CM

## 2025-06-25 DIAGNOSIS — E66.01 MORBID OBESITY WITH BMI OF 50.0-59.9, ADULT: ICD-10-CM

## 2025-06-25 DIAGNOSIS — Z13.6 ENCOUNTER FOR SCREENING FOR CARDIOVASCULAR DISORDERS: ICD-10-CM

## 2025-06-25 DIAGNOSIS — M25.562 PAIN IN BOTH KNEES, UNSPECIFIED CHRONICITY: ICD-10-CM

## 2025-06-25 DIAGNOSIS — R73.01 IMPAIRED FASTING GLUCOSE: Primary | ICD-10-CM

## 2025-06-25 LAB
ALBUMIN SERPL BCP-MCNC: 3.6 G/DL (ref 3.5–5)
ALBUMIN/GLOB SERPL: 0.8 {RATIO}
ALP SERPL-CCNC: 41 U/L (ref 40–150)
ALT SERPL W P-5'-P-CCNC: 21 U/L
ANION GAP SERPL CALCULATED.3IONS-SCNC: 13 MMOL/L (ref 7–16)
AST SERPL W P-5'-P-CCNC: 26 U/L (ref 11–45)
B-HCG UR QL: NEGATIVE
BASOPHILS # BLD AUTO: 0.03 K/UL (ref 0–0.2)
BASOPHILS NFR BLD AUTO: 0.4 % (ref 0–1)
BILIRUB SERPL-MCNC: 0.3 MG/DL
BUN SERPL-MCNC: 10 MG/DL (ref 7–19)
BUN/CREAT SERPL: 11 (ref 6–20)
CALCIUM SERPL-MCNC: 9 MG/DL (ref 8.4–10.2)
CHLORIDE SERPL-SCNC: 105 MMOL/L (ref 98–107)
CHOLEST SERPL-MCNC: 192 MG/DL
CHOLEST/HDLC SERPL: 3.5 {RATIO}
CO2 SERPL-SCNC: 24 MMOL/L (ref 22–29)
CREAT SERPL-MCNC: 0.91 MG/DL (ref 0.55–1.02)
CTP QC/QA: YES
DIFFERENTIAL METHOD BLD: ABNORMAL
EGFR (NO RACE VARIABLE) (RUSH/TITUS): 82 ML/MIN/1.73M2
EOSINOPHIL # BLD AUTO: 0.15 K/UL (ref 0–0.5)
EOSINOPHIL NFR BLD AUTO: 2 % (ref 1–4)
ERYTHROCYTE [DISTWIDTH] IN BLOOD BY AUTOMATED COUNT: 17.7 % (ref 11.5–14.5)
EST. AVERAGE GLUCOSE BLD GHB EST-MCNC: 128 MG/DL
GLOBULIN SER-MCNC: 4.3 G/DL (ref 2–4)
GLUCOSE SERPL-MCNC: 114 MG/DL (ref 74–100)
HBA1C MFR BLD HPLC: 6.1 %
HCT VFR BLD AUTO: 37.7 % (ref 38–47)
HDLC SERPL-MCNC: 55 MG/DL (ref 35–60)
HGB BLD-MCNC: 11.1 G/DL (ref 12–16)
IMM GRANULOCYTES # BLD AUTO: 0.02 K/UL (ref 0–0.04)
IMM GRANULOCYTES NFR BLD: 0.3 % (ref 0–0.4)
LDLC SERPL CALC-MCNC: 116 MG/DL
LDLC/HDLC SERPL: 2.1 {RATIO}
LYMPHOCYTES # BLD AUTO: 2.83 K/UL (ref 1–4.8)
LYMPHOCYTES NFR BLD AUTO: 37.8 % (ref 27–41)
MAGNESIUM SERPL-MCNC: 1.8 MG/DL (ref 1.6–2.6)
MCH RBC QN AUTO: 24.7 PG (ref 27–31)
MCHC RBC AUTO-ENTMCNC: 29.4 G/DL (ref 32–36)
MCV RBC AUTO: 83.8 FL (ref 80–96)
MONOCYTES # BLD AUTO: 0.51 K/UL (ref 0–0.8)
MONOCYTES NFR BLD AUTO: 6.8 % (ref 2–6)
MPC BLD CALC-MCNC: 10.2 FL (ref 9.4–12.4)
NEUTROPHILS # BLD AUTO: 3.95 K/UL (ref 1.8–7.7)
NEUTROPHILS NFR BLD AUTO: 52.7 % (ref 53–65)
NONHDLC SERPL-MCNC: 137 MG/DL
NRBC # BLD AUTO: 0 X10E3/UL
NRBC, AUTO (.00): 0 %
PLATELET # BLD AUTO: 362 K/UL (ref 150–400)
POTASSIUM SERPL-SCNC: 3.8 MMOL/L (ref 3.5–5.1)
PROT SERPL-MCNC: 7.9 G/DL (ref 6.4–8.3)
RBC # BLD AUTO: 4.5 M/UL (ref 4.2–5.4)
SODIUM SERPL-SCNC: 138 MMOL/L (ref 136–145)
TRIGL SERPL-MCNC: 103 MG/DL (ref 37–140)
VLDLC SERPL-MCNC: 21 MG/DL
WBC # BLD AUTO: 7.49 K/UL (ref 4.5–11)

## 2025-06-25 PROCEDURE — 3044F HG A1C LEVEL LT 7.0%: CPT | Mod: CPTII,,,

## 2025-06-25 PROCEDURE — 81025 URINE PREGNANCY TEST: CPT | Mod: QW,,,

## 2025-06-25 PROCEDURE — 83036 HEMOGLOBIN GLYCOSYLATED A1C: CPT | Mod: ,,, | Performed by: CLINICAL MEDICAL LABORATORY

## 2025-06-25 PROCEDURE — 99214 OFFICE O/P EST MOD 30 MIN: CPT | Mod: 25,,,

## 2025-06-25 PROCEDURE — 1159F MED LIST DOCD IN RCRD: CPT | Mod: CPTII,,,

## 2025-06-25 PROCEDURE — 3008F BODY MASS INDEX DOCD: CPT | Mod: CPTII,,,

## 2025-06-25 PROCEDURE — 3074F SYST BP LT 130 MM HG: CPT | Mod: CPTII,,,

## 2025-06-25 PROCEDURE — 80053 COMPREHEN METABOLIC PANEL: CPT | Mod: ,,, | Performed by: CLINICAL MEDICAL LABORATORY

## 2025-06-25 PROCEDURE — 1160F RVW MEDS BY RX/DR IN RCRD: CPT | Mod: CPTII,,,

## 2025-06-25 PROCEDURE — 96372 THER/PROPH/DIAG INJ SC/IM: CPT | Mod: ,,,

## 2025-06-25 PROCEDURE — 83735 ASSAY OF MAGNESIUM: CPT | Mod: ,,, | Performed by: CLINICAL MEDICAL LABORATORY

## 2025-06-25 PROCEDURE — 80061 LIPID PANEL: CPT | Mod: ,,, | Performed by: CLINICAL MEDICAL LABORATORY

## 2025-06-25 PROCEDURE — 3079F DIAST BP 80-89 MM HG: CPT | Mod: CPTII,,,

## 2025-06-25 PROCEDURE — 85025 COMPLETE CBC W/AUTO DIFF WBC: CPT | Mod: ,,, | Performed by: CLINICAL MEDICAL LABORATORY

## 2025-06-25 RX ORDER — KETOROLAC TROMETHAMINE 30 MG/ML
60 INJECTION, SOLUTION INTRAMUSCULAR; INTRAVENOUS
Status: COMPLETED | OUTPATIENT
Start: 2025-06-25 | End: 2025-06-25

## 2025-06-25 RX ORDER — IBUPROFEN 800 MG/1
800 TABLET, FILM COATED ORAL EVERY 6 HOURS PRN
Qty: 60 TABLET | Refills: 1 | Status: SHIPPED | OUTPATIENT
Start: 2025-06-25

## 2025-06-25 RX ORDER — DEXAMETHASONE SODIUM PHOSPHATE 4 MG/ML
4 INJECTION, SOLUTION INTRA-ARTICULAR; INTRALESIONAL; INTRAMUSCULAR; INTRAVENOUS; SOFT TISSUE
Status: COMPLETED | OUTPATIENT
Start: 2025-06-25 | End: 2025-06-25

## 2025-06-25 RX ORDER — CYCLOBENZAPRINE HCL 10 MG
10 TABLET ORAL NIGHTLY PRN
Qty: 30 TABLET | Refills: 0 | Status: SHIPPED | OUTPATIENT
Start: 2025-06-25 | End: 2025-07-25

## 2025-06-25 RX ADMIN — KETOROLAC TROMETHAMINE 60 MG: 30 INJECTION, SOLUTION INTRAMUSCULAR; INTRAVENOUS at 10:06

## 2025-06-25 RX ADMIN — DEXAMETHASONE SODIUM PHOSPHATE 4 MG: 4 INJECTION, SOLUTION INTRA-ARTICULAR; INTRALESIONAL; INTRAMUSCULAR; INTRAVENOUS; SOFT TISSUE at 10:06

## 2025-06-25 NOTE — PROGRESS NOTES
Marichuy Colvin NP   [address] [place] [address]     PATIENT NAME: Wilver Torres  : 1985  DATE: 25  MRN: [id number]      Billing Provider: Marichuy Colvin NP  Level of Service:   Patient PCP Information       Provider PCP Type    Marichuy Colvin NP General            Reason for Visit / Chief Complaint: Spasms (Right leg pain) and Amenorrhea       Update PCP  Update Chief Complaint         History of Present Illness / Problem Focused Workflow     Wilver Torres presents to the clinic with Spasms (Right leg pain) and Amenorrhea     Patient here today with complaints of knee pain that is worse in her right knee. She feels like she is having muscle spasms. Pain has been present for a couple of weeks but seems to be getting worse. Pain exacerbated with walking and standing. Denies known injury. She also reports that she has not has a menstrual cycle since April. UPT negative. Advised on diet and exercise to lower BMI. Advised on low carbohydrate and sugar diet. Labs today. Xray ordered. Medication administered in clinic. Medications sent to pharmacy. Declines immunizations today. Follow up in 3 months and as needed.     Spasms  Associated symptoms include arthralgias.     Review of Systems     Review of Systems   Constitutional: Negative.    Eyes: Negative.    Respiratory: Negative.     Cardiovascular: Negative.    Gastrointestinal: Negative.    Endocrine: Negative.    Genitourinary:  Positive for menstrual problem.   Musculoskeletal:  Positive for arthralgias.   Integumentary:  Negative.   Allergic/Immunologic: Negative.    Neurological: Negative.    Psychiatric/Behavioral: Negative.        Medical / Social / Family History     Past Medical History:   Diagnosis Date    Abnormal uterine bleeding     Amenorrhea     Anemia        Past Surgical History:   Procedure Laterality Date    UTERINE FIBROID SURGERY         Social History  Ms.  reports that she has been smoking cigars.  "She has never used smokeless tobacco. She reports current alcohol use. She reports that she does not use drugs.    Family History  Ms.'s family history is not on file.    Medications and Allergies     Medications  No outpatient medications have been marked as taking for the 6/25/25 encounter (Office Visit) with Marichuy Colvin NP.       Allergies  Review of patient's allergies indicates:  No Known Allergies    Physical Examination   BP (!) 155/89 (BP Location: Right arm, Patient Position: Sitting)   Pulse 98   Temp 98.1 °F (36.7 °C) (Oral)   Resp 20   Ht 5' 6" (1.676 m)   Wt (!) 141.1 kg (311 lb)   LMP 04/04/2025   SpO2 100%   BMI 50.20 kg/m²    Physical Exam  Vitals reviewed.   Constitutional:       Appearance: Normal appearance. She is obese.   Eyes:      Extraocular Movements: Extraocular movements intact.      Conjunctiva/sclera: Conjunctivae normal.      Pupils: Pupils are equal, round, and reactive to light.   Cardiovascular:      Rate and Rhythm: Normal rate and regular rhythm.      Pulses: Normal pulses.      Heart sounds: Normal heart sounds.   Pulmonary:      Effort: Pulmonary effort is normal. No respiratory distress.      Breath sounds: Normal breath sounds.   Abdominal:      General: Bowel sounds are normal.      Palpations: Abdomen is soft.      Tenderness: There is no abdominal tenderness. There is no guarding.   Musculoskeletal:         General: Tenderness present. No swelling.      Right knee: Crepitus present. Tenderness present.      Left knee: Crepitus present. Tenderness present.   Skin:     General: Skin is warm and dry.      Capillary Refill: Capillary refill takes less than 2 seconds.   Neurological:      General: No focal deficit present.      Mental Status: She is alert and oriented to person, place, and time.   Psychiatric:         Mood and Affect: Mood normal.         Behavior: Behavior normal.        Assessment and Plan (including Health Maintenance)      Problem List  Smart " Sets  Document Outside HM   :    Plan:   UPT negative.   Advised on diet and exercise to lower BMI.   Advised on low carbohydrate and sugar diet.   Labs today.   Xray ordered.   Medication administered in clinic.   Medications sent to pharmacy.   Follow up in 3 months and as needed.         Health Maintenance Due   Topic Date Due    TETANUS VACCINE  Never done    Pneumococcal Vaccines (Age 0-49) (1 of 2 - PCV) Never done    COVID-19 Vaccine (1 - 2024-25 season) Never done       Problem List Items Addressed This Visit    None      Health Maintenance Topics with due status: Not Due       Topic Last Completion Date    Cervical Cancer Screening 09/30/2024    Hemoglobin A1c (Prediabetes) 03/11/2025    Influenza Vaccine Not Due    RSV Vaccine (Age 60+ and Pregnant patients) Not Due       Future Appointments   Date Time Provider Department Center   9/16/2025  9:00 AM Marichuy Colvin NP [facility]   10/2/2025  9:45 AM Adin Dooley DO OB OBGYN [facility] [place]            Signature:  Marichuy Colvin NP      [address] [place] [address]    Date of encounter: 6/25/25

## 2025-06-25 NOTE — PATIENT INSTRUCTIONS
UPT negative.   Advised on diet and exercise to lower BMI.   Advised on low carbohydrate and sugar diet.   Labs today.   Xray ordered.   Medication administered in clinic.   Medications sent to pharmacy.   Follow up in 3 months and as needed.

## 2025-06-26 ENCOUNTER — HOSPITAL ENCOUNTER (OUTPATIENT)
Dept: RADIOLOGY | Facility: HOSPITAL | Age: 40
Discharge: HOME | End: 2025-06-26
Payer: COMMERCIAL

## 2025-06-26 ENCOUNTER — RESULTS FOLLOW-UP (OUTPATIENT)
Dept: FAMILY MEDICINE | Facility: CLINIC | Age: 40
End: 2025-06-26

## 2025-06-26 DIAGNOSIS — R73.01 IMPAIRED FASTING GLUCOSE: Primary | ICD-10-CM

## 2025-06-26 PROCEDURE — 73560 X-RAY EXAM OF KNEE 1 OR 2: CPT | Mod: TC,50

## 2025-06-26 RX ORDER — METFORMIN HYDROCHLORIDE 500 MG/1
500 TABLET, EXTENDED RELEASE ORAL
Qty: 90 TABLET | Refills: 3 | Status: SHIPPED | OUTPATIENT
Start: 2025-06-26 | End: 2026-06-26

## 2025-07-03 ENCOUNTER — TELEPHONE (OUTPATIENT)
Dept: FAMILY MEDICINE | Facility: CLINIC | Age: 40
End: 2025-07-03
Payer: COMMERCIAL

## 2025-07-03 NOTE — TELEPHONE ENCOUNTER
Spoke with patient and recommend she follow up with ortho. Can be seen at [facility] sports med/ortho as walk in.

## 2025-07-03 NOTE — TELEPHONE ENCOUNTER
Copied from CRM #[id number]. Topic: General Inquiry - Patient Advice  >> Jul 3, 2025  2:33 PM [place] wrote:  Who Called: Wilver Torres    Caller is requesting information on test results.    Name of Test (Lab/Mammo/Etc):  xray  Date of Test:  06/25/2025  Ordering Provider:  CAROL Colvin    Patient's Preferred Phone Number on File: [phone]     Additional Information:  asking for a return call to speak with Dr. Colvin about xray results. She states she is still in pain.